# Patient Record
Sex: FEMALE | Race: BLACK OR AFRICAN AMERICAN | Employment: PART TIME | URBAN - METROPOLITAN AREA
[De-identification: names, ages, dates, MRNs, and addresses within clinical notes are randomized per-mention and may not be internally consistent; named-entity substitution may affect disease eponyms.]

---

## 2018-11-17 ENCOUNTER — HOSPITAL ENCOUNTER (EMERGENCY)
Age: 23
Discharge: HOME | End: 2018-11-17
Attending: EMERGENCY MEDICINE
Payer: COMMERCIAL

## 2018-11-17 VITALS
TEMPERATURE: 98.1 F | SYSTOLIC BLOOD PRESSURE: 121 MMHG | WEIGHT: 125 LBS | HEIGHT: 62 IN | OXYGEN SATURATION: 100 % | RESPIRATION RATE: 18 BRPM | HEART RATE: 93 BPM | DIASTOLIC BLOOD PRESSURE: 92 MMHG | BODY MASS INDEX: 23 KG/M2

## 2018-11-17 DIAGNOSIS — N30.00 ACUTE CYSTITIS WITHOUT HEMATURIA: ICD-10-CM

## 2018-11-17 DIAGNOSIS — R11.2 NAUSEA AND VOMITING, INTRACTABILITY OF VOMITING NOT SPECIFIED, UNSPECIFIED VOMITING TYPE: Primary | ICD-10-CM

## 2018-11-17 LAB
ALBUMIN SERPL-MCNC: 4.1 G/DL (ref 3.5–5)
ALBUMIN/GLOB SERPL: 1 {RATIO} (ref 1.1–2.2)
ALP SERPL-CCNC: 54 U/L (ref 45–117)
ALT SERPL-CCNC: 15 U/L (ref 12–78)
ANION GAP SERPL CALC-SCNC: 13 MMOL/L (ref 5–15)
APPEARANCE UR: ABNORMAL
AST SERPL-CCNC: 21 U/L (ref 15–37)
BACTERIA URNS QL MICRO: ABNORMAL /HPF
BASOPHILS # BLD: 0 K/UL (ref 0–0.1)
BASOPHILS NFR BLD: 0 % (ref 0–1)
BILIRUB SERPL-MCNC: 0.7 MG/DL (ref 0.2–1)
BILIRUB UR QL: NEGATIVE
BUN SERPL-MCNC: 11 MG/DL (ref 6–20)
BUN/CREAT SERPL: 19 (ref 12–20)
CALCIUM SERPL-MCNC: 9.3 MG/DL (ref 8.5–10.1)
CHLORIDE SERPL-SCNC: 102 MMOL/L (ref 97–108)
CO2 SERPL-SCNC: 21 MMOL/L (ref 21–32)
COLOR UR: ABNORMAL
CREAT SERPL-MCNC: 0.59 MG/DL (ref 0.55–1.02)
DIFFERENTIAL METHOD BLD: ABNORMAL
EOSINOPHIL # BLD: 0 K/UL (ref 0–0.4)
EOSINOPHIL NFR BLD: 0 % (ref 0–7)
EPITH CASTS URNS QL MICRO: ABNORMAL /LPF
ERYTHROCYTE [DISTWIDTH] IN BLOOD BY AUTOMATED COUNT: 14 % (ref 11.5–14.5)
GLOBULIN SER CALC-MCNC: 4.1 G/DL (ref 2–4)
GLUCOSE SERPL-MCNC: 90 MG/DL (ref 65–100)
GLUCOSE UR STRIP.AUTO-MCNC: NEGATIVE MG/DL
HCG SERPL-ACNC: ABNORMAL MIU/ML (ref 0–6)
HCG UR QL: POSITIVE
HCT VFR BLD AUTO: 44.1 % (ref 35–47)
HGB BLD-MCNC: 14.8 G/DL (ref 11.5–16)
HGB UR QL STRIP: NEGATIVE
IMM GRANULOCYTES # BLD: 0 K/UL (ref 0–0.04)
IMM GRANULOCYTES NFR BLD AUTO: 0 % (ref 0–0.5)
KETONES UR QL STRIP.AUTO: 40 MG/DL
LEUKOCYTE ESTERASE UR QL STRIP.AUTO: ABNORMAL
LYMPHOCYTES # BLD: 2.4 K/UL (ref 0.8–3.5)
LYMPHOCYTES NFR BLD: 30 % (ref 12–49)
MCH RBC QN AUTO: 28.4 PG (ref 26–34)
MCHC RBC AUTO-ENTMCNC: 33.6 G/DL (ref 30–36.5)
MCV RBC AUTO: 84.5 FL (ref 80–99)
MONOCYTES # BLD: 0.9 K/UL (ref 0–1)
MONOCYTES NFR BLD: 11 % (ref 5–13)
NEUTS SEG # BLD: 4.6 K/UL (ref 1.8–8)
NEUTS SEG NFR BLD: 58 % (ref 32–75)
NITRITE UR QL STRIP.AUTO: NEGATIVE
NRBC # BLD: 0 K/UL (ref 0–0.01)
NRBC BLD-RTO: 0 PER 100 WBC
PH UR STRIP: 7 [PH] (ref 5–8)
PLATELET # BLD AUTO: 270 K/UL (ref 150–400)
PMV BLD AUTO: 10.4 FL (ref 8.9–12.9)
POTASSIUM SERPL-SCNC: 4.1 MMOL/L (ref 3.5–5.1)
PROT SERPL-MCNC: 8.2 G/DL (ref 6.4–8.2)
PROT UR STRIP-MCNC: NEGATIVE MG/DL
RBC # BLD AUTO: 5.22 M/UL (ref 3.8–5.2)
RBC #/AREA URNS HPF: ABNORMAL /HPF (ref 0–5)
SODIUM SERPL-SCNC: 136 MMOL/L (ref 136–145)
SP GR UR REFRACTOMETRY: 1.02 (ref 1–1.03)
UR CULT HOLD, URHOLD: NORMAL
UROBILINOGEN UR QL STRIP.AUTO: 0.2 EU/DL (ref 0.2–1)
WBC # BLD AUTO: 7.8 K/UL (ref 3.6–11)
WBC URNS QL MICRO: ABNORMAL /HPF (ref 0–4)
YEAST URNS QL MICRO: PRESENT

## 2018-11-17 PROCEDURE — 81001 URINALYSIS AUTO W/SCOPE: CPT

## 2018-11-17 PROCEDURE — 96361 HYDRATE IV INFUSION ADD-ON: CPT

## 2018-11-17 PROCEDURE — 36415 COLL VENOUS BLD VENIPUNCTURE: CPT

## 2018-11-17 PROCEDURE — 80053 COMPREHEN METABOLIC PANEL: CPT

## 2018-11-17 PROCEDURE — 74011250637 HC RX REV CODE- 250/637: Performed by: EMERGENCY MEDICINE

## 2018-11-17 PROCEDURE — 84702 CHORIONIC GONADOTROPIN TEST: CPT

## 2018-11-17 PROCEDURE — 96374 THER/PROPH/DIAG INJ IV PUSH: CPT

## 2018-11-17 PROCEDURE — 85025 COMPLETE CBC W/AUTO DIFF WBC: CPT

## 2018-11-17 PROCEDURE — 74011250636 HC RX REV CODE- 250/636: Performed by: EMERGENCY MEDICINE

## 2018-11-17 PROCEDURE — 99284 EMERGENCY DEPT VISIT MOD MDM: CPT

## 2018-11-17 PROCEDURE — 86900 BLOOD TYPING SEROLOGIC ABO: CPT

## 2018-11-17 PROCEDURE — 81025 URINE PREGNANCY TEST: CPT

## 2018-11-17 PROCEDURE — 87086 URINE CULTURE/COLONY COUNT: CPT

## 2018-11-17 RX ORDER — ONDANSETRON 2 MG/ML
4 INJECTION INTRAMUSCULAR; INTRAVENOUS
Status: COMPLETED | OUTPATIENT
Start: 2018-11-17 | End: 2018-11-17

## 2018-11-17 RX ORDER — SODIUM CHLORIDE 0.9 % (FLUSH) 0.9 %
5-10 SYRINGE (ML) INJECTION EVERY 8 HOURS
Status: DISCONTINUED | OUTPATIENT
Start: 2018-11-17 | End: 2018-11-18 | Stop reason: HOSPADM

## 2018-11-17 RX ORDER — CEPHALEXIN 500 MG/1
500 CAPSULE ORAL 3 TIMES DAILY
Qty: 21 CAP | Refills: 0 | Status: SHIPPED | OUTPATIENT
Start: 2018-11-17 | End: 2018-11-24

## 2018-11-17 RX ORDER — SODIUM CHLORIDE 0.9 % (FLUSH) 0.9 %
5-10 SYRINGE (ML) INJECTION AS NEEDED
Status: DISCONTINUED | OUTPATIENT
Start: 2018-11-17 | End: 2018-11-18 | Stop reason: HOSPADM

## 2018-11-17 RX ORDER — PROMETHAZINE HYDROCHLORIDE 25 MG/1
25 SUPPOSITORY RECTAL
Qty: 12 SUPPOSITORY | Refills: 0 | Status: SHIPPED | OUTPATIENT
Start: 2018-11-17 | End: 2018-11-24

## 2018-11-17 RX ORDER — CEPHALEXIN 250 MG/1
500 CAPSULE ORAL
Status: COMPLETED | OUTPATIENT
Start: 2018-11-17 | End: 2018-11-17

## 2018-11-17 RX ORDER — PROMETHAZINE HYDROCHLORIDE 25 MG/1
25 TABLET ORAL
Qty: 12 TAB | Refills: 0 | Status: SHIPPED | OUTPATIENT
Start: 2018-11-17 | End: 2018-12-01

## 2018-11-17 RX ADMIN — SODIUM CHLORIDE 1000 ML: 900 INJECTION, SOLUTION INTRAVENOUS at 20:41

## 2018-11-17 RX ADMIN — CEPHALEXIN 500 MG: 250 CAPSULE ORAL at 21:41

## 2018-11-17 RX ADMIN — ONDANSETRON 4 MG: 2 INJECTION INTRAMUSCULAR; INTRAVENOUS at 20:41

## 2018-11-18 LAB
ABO + RH BLD: NORMAL
BLOOD BANK CMNT PATIENT-IMP: NORMAL

## 2018-11-18 NOTE — DISCHARGE INSTRUCTIONS
We hope that we have addressed all of your medical concerns. The examination and treatment you received in the Emergency Department were for an emergent problem and were not intended as complete care. It is important that you follow up with your healthcare provider(s) for ongoing care. If your symptoms worsen or do not improve as expected, and you are unable to reach your usual health care provider(s), you should return to the Emergency Department. Today's healthcare is undergoing tremendous change, and patient satisfaction surveys are one of the many tools to assess the quality of medical care. You may receive a survey from the [employer] regarding your experience in the Emergency Department. I hope that your experience has been completely positive, particularly the medical care that I provided. As such, please participate in the survey; anything less than excellent does not meet my expectations or intentions. [address] [place] [address] participate in nationally recognized quality of care measures. If your blood pressure is greater than 120/80, as reported below, we urge that you seek medical care to address the potential of high blood pressure, commonly known as hypertension. Hypertension can be hereditary or can be caused by certain medical conditions, pain, stress, or \"white coat syndrome. \"       Please make an appointment with your health care provider(s) for follow up of your Emergency Department visit. VITALS:   Patient Vitals for the past 8 hrs:   Temp Pulse Resp BP SpO2   11/17/18 2130 -- -- -- 114/71 100 %   11/17/18 2117 -- -- -- 114/76 100 %   11/17/18 2021 98.1 °F (36.7 °C) 93 18 114/83 100 %          Thank you for allowing us to provide you with medical care today. We realize that you have many choices for your emergency care needs. Please choose us in the future for any continued health care needs.       Regards, Jaylin Farooq [facility] [email or web address]   Office: [phone]            Recent Results (from the past 24 hour(s))   CBC WITH AUTOMATED DIFF    Collection Time: 11/17/18  8:39 PM   Result Value Ref Range    WBC 7.8 3.6 - 11.0 K/uL    RBC 5.22 (H) 3.80 - 5.20 M/uL    HGB 14.8 11.5 - 16.0 g/dL    HCT 44.1 35.0 - 47.0 %    MCV 84.5 80.0 - 99.0 FL    MCH 28.4 26.0 - 34.0 PG    MCHC 33.6 30.0 - 36.5 g/dL    RDW 14.0 11.5 - 14.5 %    PLATELET 663 227 - 691 K/uL    MPV 10.4 8.9 - 12.9 FL    NRBC 0.0 0  WBC    ABSOLUTE NRBC 0.00 0.00 - 0.01 K/uL    NEUTROPHILS 58 32 - 75 %    LYMPHOCYTES 30 12 - 49 %    MONOCYTES 11 5 - 13 %    EOSINOPHILS 0 0 - 7 %    BASOPHILS 0 0 - 1 %    IMMATURE GRANULOCYTES 0 0.0 - 0.5 %    ABS. NEUTROPHILS 4.6 1.8 - 8.0 K/UL    ABS. LYMPHOCYTES 2.4 0.8 - 3.5 K/UL    ABS. MONOCYTES 0.9 0.0 - 1.0 K/UL    ABS. EOSINOPHILS 0.0 0.0 - 0.4 K/UL    ABS. BASOPHILS 0.0 0.0 - 0.1 K/UL    ABS. IMM. GRANS. 0.0 0.00 - 0.04 K/UL    DF AUTOMATED     METABOLIC PANEL, COMPREHENSIVE    Collection Time: 11/17/18  8:39 PM   Result Value Ref Range    Sodium 136 136 - 145 mmol/L    Potassium 4.1 3.5 - 5.1 mmol/L    Chloride 102 97 - 108 mmol/L    CO2 21 21 - 32 mmol/L    Anion gap 13 5 - 15 mmol/L    Glucose 90 65 - 100 mg/dL    BUN 11 6 - 20 MG/DL    Creatinine 0.59 0.55 - 1.02 MG/DL    BUN/Creatinine ratio 19 12 - 20      GFR est AA >60 >60 ml/min/1.73m2    GFR est non-AA >60 >60 ml/min/1.73m2    Calcium 9.3 8.5 - 10.1 MG/DL    Bilirubin, total 0.7 0.2 - 1.0 MG/DL    ALT (SGPT) 15 12 - 78 U/L    AST (SGOT) 21 15 - 37 U/L    Alk.  phosphatase 54 45 - 117 U/L    Protein, total 8.2 6.4 - 8.2 g/dL    Albumin 4.1 3.5 - 5.0 g/dL    Globulin 4.1 (H) 2.0 - 4.0 g/dL    A-G Ratio 1.0 (L) 1.1 - 2.2     URINALYSIS W/MICROSCOPIC    Collection Time: 11/17/18  8:39 PM   Result Value Ref Range    Color YELLOW/STRAW      Appearance CLOUDY (A) CLEAR      Specific gravity 1.022 1.003 - 1.030 pH (UA) 7.0 5.0 - 8.0      Protein NEGATIVE  NEG mg/dL    Glucose NEGATIVE  NEG mg/dL    Ketone 40 (A) NEG mg/dL    Bilirubin NEGATIVE  NEG      Blood NEGATIVE  NEG      Urobilinogen 0.2 0.2 - 1.0 EU/dL    Nitrites NEGATIVE  NEG      Leukocyte Esterase SMALL (A) NEG      WBC 5-10 0 - 4 /hpf    RBC 0-5 0 - 5 /hpf    Epithelial cells MODERATE (A) FEW /lpf    Bacteria 1+ (A) NEG /hpf    Yeast PRESENT (A) NEG     URINE CULTURE HOLD SAMPLE    Collection Time: 11/17/18  8:39 PM   Result Value Ref Range    Urine culture hold        URINE ON HOLD IN MICROBIOLOGY DEPT FOR 3 DAYS. IF UNPRESERVED URINE IS SUBMITTED, IT CANNOT BE USED FOR ADDITIONAL TESTING AFTER 24 HRS, RECOLLECTION WILL BE REQUIRED. BETA HCG, QT    Collection Time: 11/17/18  8:39 PM   Result Value Ref Range    Beta HCG, QT 87,392 (H) 0 - 6 MIU/ML   HCG URINE, QL. - POC    Collection Time: 11/17/18  9:18 PM   Result Value Ref Range    Pregnancy test,urine (POC) POSITIVE (A) NEG         No results found. Nausea and Vomiting: Care Instructions  Your Care Instructions    When you are nauseated, you may feel weak and sweaty and notice a lot of saliva in your mouth. Nausea often leads to vomiting. Most of the time you do not need to worry about nausea and vomiting, but they can be signs of other illnesses. Two common causes of nausea and vomiting are stomach flu and food poisoning. Nausea and vomiting from viral stomach flu will usually start to improve within 24 hours. Nausea and vomiting from food poisoning may last from 12 to 48 hours. The doctor has checked you carefully, but problems can develop later. If you notice any problems or new symptoms, get medical treatment right away. Follow-up care is a key part of your treatment and safety. Be sure to make and go to all appointments, and call your doctor if you are having problems. It's also a good idea to know your test results and keep a list of the medicines you take.   How can you care for yourself at home? · To prevent dehydration, drink plenty of fluids, enough so that your urine is light yellow or clear like water. Choose water and other caffeine-free clear liquids until you feel better. If you have kidney, heart, or liver disease and have to limit fluids, talk with your doctor before you increase the amount of fluids you drink. · Rest in bed until you feel better. · When you are able to eat, try clear soups, mild foods, and liquids until all symptoms are gone for 12 to 48 hours. Other good choices include dry toast, crackers, cooked cereal, and gelatin dessert, such as Jell-O. When should you call for help? Call 911 anytime you think you may need emergency care. For example, call if:    · You passed out (lost consciousness).    Call your doctor now or seek immediate medical care if:    · You have symptoms of dehydration, such as:  ? Dry eyes and a dry mouth. ? Passing only a little dark urine. ? Feeling thirstier than usual.     · You have new or worsening belly pain.     · You have a new or higher fever.     · You vomit blood or what looks like coffee grounds.    Watch closely for changes in your health, and be sure to contact your doctor if:    · You have ongoing nausea and vomiting.     · Your vomiting is getting worse.     · Your vomiting lasts longer than 2 days.     · You are not getting better as expected. Where can you learn more? Go to http://geovanna-dev.info/. Enter 25 [id number] in the search box to learn more about \"Nausea and Vomiting: Care Instructions. \"  Current as of: November 20, 2017  Content Version: 11.8  © 9774-4146 [employer]. Care instructions adapted under license by [employer] (which disclaims liability or warranty for this information).  If you have questions about a medical condition or this instruction, always ask your healthcare professional. [address] any warranty or liability for your use of this information. Urinary Tract Infection in Pregnancy: Care Instructions  Your Care Instructions    A urinary tract infection, or UTI, is an infection of the bladder and other urinary structures. Most UTIs occur in the bladder. They often cause pain or burning when you urinate. UTI is the most common bacterial infection in pregnancy. If untreated, a UTI could lead to problems such as a kidney infection or  labor. Most UTIs can be cured with antibiotics. Your doctor will prescribe an antibiotic that is safe during pregnancy. Be sure to finish your medicine so that the infection does not spread to your kidneys. Follow-up care is a key part of your treatment and safety. Be sure to make and go to all appointments, and call your doctor if you are having problems. It's also a good idea to know your test results and keep a list of the medicines you take. How can you care for yourself at home? · Take your antibiotics as directed. Do not stop taking them just because you feel better. You need to take the full course of antibiotics. · Drink extra water and other fluids for the next day or two. This will help wash out the bacteria causing the infection. If you have kidney, heart, or liver disease and have to limit fluids, talk with your doctor before you increase the amount of fluids you drink. · Do not drink alcohol. · Urinate often. Try to empty your bladder each time. Preventing UTIs  · Drink plenty of fluids, enough so that your urine is light yellow or clear like water. This helps you urinate often, which clears bacteria from your system. If you have kidney, heart, or liver disease and have to limit fluids, talk with your doctor before you increase the amount of fluids you drink. · Urinate when you first have the urge. · Urinate right after you have sex. This is the best way for women to avoid UTIs.   · When going to the bathroom, wipe from front to back to keep bacteria from entering the vagina or urethra. When should you call for help? Call your doctor now or seek immediate medical care if:    · You have symptoms of a worse urinary tract infection. These may include:  ? Pain or burning when you urinate. ? A frequent need to urinate without being able to pass much urine. ? Pain in the flank, which is just below the rib cage and above the waist on either side of the back. ? Blood in your urine. ? A fever.    Watch closely for changes in your health, and be sure to contact your doctor if:    · You do not get better as expected. Where can you learn more? Go to http://geovanna-dev.info/. Enter M982 in the search box to learn more about \"Urinary Tract Infection in Pregnancy: Care Instructions. \"  Current as of: November 21, 2017  Content Version: 11.8  © 7172-4376 [employer]. Care instructions adapted under license by [employer] (which disclaims liability or warranty for this information). If you have questions about a medical condition or this instruction, always ask your healthcare professional. [place] [address] any warranty or liability for your use of this information.

## 2018-11-18 NOTE — ED PROVIDER NOTES
24 y.o. female ( A0, 7 weeks pregnant) with no significant past medical history presents with chief complaint of nausea and vomiting that started today. Pt reports associated dizziness. She notes that she went to see her OB/GYN who gave her Zofran. Pt notes that she has not taken her prenatals in the last 3 days. She denies any abd surgeries in the past. LMP was 10/01/18. Pt denies any vaginal discharge, vaginal bleeding, or dysuria currently. There are no other acute medical concerns at this time. Social hx: Patient denies Tobacco use. Denies EtOH use. Denies illicit drug abuse. PCP: Lillian Haas MD 
OB/GYN: Dr. Emma Glover. Melida Hill MD 
 
Note written by Luanne Braxton, as dictated by Brian Mars, DO 8:34 PM 
 
 
 
The history is provided by the patient. No  was used. No past medical history on file. No past surgical history on file. No family history on file. Social History Socioeconomic History  Marital status:  Spouse name: Not on file  Number of children: Not on file  Years of education: Not on file  Highest education level: Not on file Social Needs  Financial resource strain: Not on file  Food insecurity - worry: Not on file  Food insecurity - inability: Not on file  Transportation needs - medical: Not on file  Transportation needs - non-medical: Not on file Occupational History  Not on file Tobacco Use  Smoking status: Never Smoker Substance and Sexual Activity  Alcohol use: No  
 Drug use: No  
 Sexual activity: No  
  Birth control/protection: None Other Topics Concern  Not on file Social History Narrative  Not on file ALLERGIES: Patient has no known allergies. Review of Systems Constitutional: Negative for appetite change, chills, fever and unexpected weight change. HENT: Negative for ear pain, hearing loss, rhinorrhea and trouble swallowing. Eyes: Negative for pain and visual disturbance. Respiratory: Negative for cough, chest tightness and shortness of breath. Cardiovascular: Negative for chest pain and palpitations. Gastrointestinal: Positive for nausea and vomiting. Negative for abdominal distention, abdominal pain and blood in stool. Genitourinary: Negative for dysuria, hematuria, urgency, vaginal bleeding and vaginal discharge. Musculoskeletal: Negative for back pain and myalgias. Skin: Negative for rash. Neurological: Positive for dizziness. Negative for syncope, weakness and numbness. Psychiatric/Behavioral: Negative for confusion and suicidal ideas. All other systems reviewed and are negative. Vitals:  
 11/17/18 2021 11/17/18 2117 11/17/18 2130 11/17/18 2145 BP: 114/83 114/76 114/71 (!) 121/92 Pulse: 93 Resp: 18 Temp: 98.1 °F (36.7 °C) SpO2: 100% 100% 100% 100% Weight: 56.7 kg (125 lb) Height: 5' 2\" (1.575 m) Physical Exam  
Constitutional: She is oriented to person, place, and time. She appears well-developed and well-nourished. No distress. HENT:  
Head: Normocephalic and atraumatic. Right Ear: External ear normal.  
Left Ear: External ear normal.  
Nose: Nose normal.  
Mouth/Throat: Oropharynx is clear and moist. No oropharyngeal exudate. Eyes: Conjunctivae and EOM are normal. Pupils are equal, round, and reactive to light. Right eye exhibits no discharge. Left eye exhibits no discharge. No scleral icterus. Neck: Normal range of motion. Neck supple. No JVD present. No tracheal deviation present. Cardiovascular: Normal rate, regular rhythm, normal heart sounds and intact distal pulses. Exam reveals no gallop and no friction rub. No murmur heard. Pulmonary/Chest: Effort normal and breath sounds normal. No stridor. No respiratory distress. She has no decreased breath sounds. She has no wheezes. She has no rhonchi. She has no rales. She exhibits no tenderness. Abdominal: Soft. Bowel sounds are normal. She exhibits no distension. There is no tenderness. There is no rebound and no guarding. Musculoskeletal: Normal range of motion. She exhibits no edema or tenderness. Neurological: She is alert and oriented to person, place, and time. She has normal strength and normal reflexes. She displays normal reflexes. No cranial nerve deficit or sensory deficit. She exhibits normal muscle tone. Coordination normal. GCS eye subscore is 4. GCS verbal subscore is 5. GCS motor subscore is 6. Skin: Skin is warm and dry. No rash noted. She is not diaphoretic. No erythema. No pallor. Psychiatric: She has a normal mood and affect. Her behavior is normal. Judgment and thought content normal.  
Nursing note and vitals reviewed. Note written by Brice Gitelman, DO, Scribe, as dictated by No att. providers found 8:38 PM  
 
MDM Procedures Chief Complaint Patient presents with  Pregnancy Problem The patient's presenting problems have been discussed, and they are in agreement with the care plan formulated and outlined with them. I have encouraged them to ask questions as they arise throughout their visit. MEDICATIONS GIVEN: 
Medications  
sodium chloride (NS) flush 5-10 mL (not administered)  
sodium chloride (NS) flush 5-10 mL (not administered)  
sodium chloride 0.9 % bolus infusion 1,000 mL (0 mL IntraVENous IV Completed 11/17/18 2141) ondansetron [facility]) injection 4 mg (4 mg IntraVENous Given 11/17/18 2041) cephALEXin (KEFLEX) capsule 500 mg (500 mg Oral Given 11/17/18 2141) LABS REVIEWED: 
Recent Results (from the past 24 hour(s)) CBC WITH AUTOMATED DIFF Collection Time: 11/17/18  8:39 PM  
Result Value Ref Range WBC 7.8 3.6 - 11.0 K/uL  
 RBC 5.22 (H) 3.80 - 5.20 M/uL  
 HGB 14.8 11.5 - 16.0 g/dL HCT 44.1 35.0 - 47.0 % MCV 84.5 80.0 - 99.0 FL  
 MCH 28.4 26.0 - 34.0 PG  
 MCHC 33.6 30.0 - 36.5 g/dL  
 RDW 14.0 11.5 - 14.5 % PLATELET 073 159 - 516 K/uL MPV 10.4 8.9 - 12.9 FL  
 NRBC 0.0 0  WBC ABSOLUTE NRBC 0.00 0.00 - 0.01 K/uL NEUTROPHILS 58 32 - 75 % LYMPHOCYTES 30 12 - 49 % MONOCYTES 11 5 - 13 % EOSINOPHILS 0 0 - 7 % BASOPHILS 0 0 - 1 % IMMATURE GRANULOCYTES 0 0.0 - 0.5 % ABS. NEUTROPHILS 4.6 1.8 - 8.0 K/UL  
 ABS. LYMPHOCYTES 2.4 0.8 - 3.5 K/UL  
 ABS. MONOCYTES 0.9 0.0 - 1.0 K/UL  
 ABS. EOSINOPHILS 0.0 0.0 - 0.4 K/UL  
 ABS. BASOPHILS 0.0 0.0 - 0.1 K/UL  
 ABS. IMM. GRANS. 0.0 0.00 - 0.04 K/UL  
 DF AUTOMATED METABOLIC PANEL, COMPREHENSIVE Collection Time: 11/17/18  8:39 PM  
Result Value Ref Range Sodium 136 136 - 145 mmol/L Potassium 4.1 3.5 - 5.1 mmol/L Chloride 102 97 - 108 mmol/L  
 CO2 21 21 - 32 mmol/L Anion gap 13 5 - 15 mmol/L Glucose 90 65 - 100 mg/dL BUN 11 6 - 20 MG/DL Creatinine 0.59 0.55 - 1.02 MG/DL  
 BUN/Creatinine ratio 19 12 - 20 GFR est AA >60 >60 ml/min/1.73m2 GFR est non-AA >60 >60 ml/min/1.73m2 Calcium 9.3 8.5 - 10.1 MG/DL Bilirubin, total 0.7 0.2 - 1.0 MG/DL  
 ALT (SGPT) 15 12 - 78 U/L  
 AST (SGOT) 21 15 - 37 U/L Alk. phosphatase 54 45 - 117 U/L Protein, total 8.2 6.4 - 8.2 g/dL Albumin 4.1 3.5 - 5.0 g/dL Globulin 4.1 (H) 2.0 - 4.0 g/dL A-G Ratio 1.0 (L) 1.1 - 2.2 URINALYSIS W/MICROSCOPIC Collection Time: 11/17/18  8:39 PM  
Result Value Ref Range Color YELLOW/STRAW Appearance CLOUDY (A) CLEAR Specific gravity 1.022 1.003 - 1.030    
 pH (UA) 7.0 5.0 - 8.0 Protein NEGATIVE  NEG mg/dL Glucose NEGATIVE  NEG mg/dL Ketone 40 (A) NEG mg/dL Bilirubin NEGATIVE  NEG Blood NEGATIVE  NEG Urobilinogen 0.2 0.2 - 1.0 EU/dL Nitrites NEGATIVE  NEG Leukocyte Esterase SMALL (A) NEG    
 WBC 5-10 0 - 4 /hpf  
 RBC 0-5 0 - 5 /hpf Epithelial cells MODERATE (A) FEW /lpf Bacteria 1+ (A) NEG /hpf Yeast PRESENT (A) NEG URINE CULTURE HOLD SAMPLE  
 Collection Time: 11/17/18  8:39 PM  
Result Value Ref Range Urine culture hold URINE ON HOLD IN MICROBIOLOGY DEPT FOR 3 DAYS. IF UNPRESERVED URINE IS SUBMITTED, IT CANNOT BE USED FOR ADDITIONAL TESTING AFTER 24 HRS, RECOLLECTION WILL BE REQUIRED. BETA HCG, QT Collection Time: 11/17/18  8:39 PM  
Result Value Ref Range Beta HCG, QT 87,392 (H) 0 - 6 MIU/ML  
BLOOD TYPE, (ABO+RH) Collection Time: 11/17/18  8:39 PM  
Result Value Ref Range ABO/Rh(D) A POSITIVE Comment SAMPLE NOT USABLE FOR CROSSMATCH   
HCG URINE, QL. - POC Collection Time: 11/17/18  9:18 PM  
Result Value Ref Range Pregnancy test,urine (POC) POSITIVE (A) NEG    
 
 
VITAL SIGNS: 
Patient Vitals for the past 12 hrs: 
 Temp Pulse Resp BP SpO2  
11/17/18 2145    (!) 121/92 100 % 11/17/18 2130    114/71 100 % 11/17/18 2117    114/76 100 % 11/17/18 2021 98.1 °F (36.7 °C) 93 18 114/83 100 % RADIOLOGY RESULTS: 
The following have been ordered and reviewed: 
No results found. PROGRESS NOTES: 
Pt feeling better. Discussed results and plan with patient. Patient will be discharged home with OB/Gyn follow up. Patient instructed to return to the emergency room for any worsening symptoms or any other concerns. DIAGNOSIS: 
 
1. Nausea and vomiting, intractability of vomiting not specified, unspecified vomiting type 2. Acute cystitis without hematuria PLAN: 
Follow-up Information Follow up With Specialties Details Why Contact Venkatesh Nichols DO Obstetrics & Gynecology, Gynecology, Obstetrics Schedule an appointment as soon as possible for a visit  [address] [facility] [address] [facility] 
[phone] [facility] EMERGENCY DEPT Emergency Medicine  If symptoms worsen [address] 
[phone] Discharge Medication List as of 11/17/2018  9:39 PM  
  
START taking these medications Details cephALEXin (KEFLEX) 500 mg capsule Take 1 Cap by mouth three (3) times daily for 7 days. , Print, Disp-21 Cap, R-0  
  
promethazine (PHENERGAN) 25 mg tablet Take 1 Tab by mouth every six (6) hours as needed. , Print, Disp-12 Tab, R-0  
  
promethazine (PHENERGAN) 25 mg suppository Insert 1 Suppository into rectum every six (6) hours as needed for Nausea for up to 7 days. , Print, Disp-12 Suppository, R-0  
  
  
 
 
ED COURSE: The patient's hospital course has been uncomplicated.

## 2018-11-18 NOTE — ED NOTES
Discharged by provider. Leaves ambulatory with discharge paperwork, denies wheelchair. Denies questions.

## 2018-11-19 LAB
BACTERIA SPEC CULT: NORMAL
CC UR VC: NORMAL
SERVICE CMNT-IMP: NORMAL

## 2018-12-01 ENCOUNTER — HOSPITAL ENCOUNTER (EMERGENCY)
Age: 23
Discharge: HOME | End: 2018-12-01
Attending: EMERGENCY MEDICINE
Payer: COMMERCIAL

## 2018-12-01 VITALS
SYSTOLIC BLOOD PRESSURE: 115 MMHG | HEIGHT: 62 IN | HEART RATE: 92 BPM | WEIGHT: 125 LBS | RESPIRATION RATE: 20 BRPM | TEMPERATURE: 98.7 F | OXYGEN SATURATION: 100 % | BODY MASS INDEX: 23 KG/M2 | DIASTOLIC BLOOD PRESSURE: 60 MMHG

## 2018-12-01 DIAGNOSIS — B37.31 YEAST INFECTION OF THE VAGINA: ICD-10-CM

## 2018-12-01 DIAGNOSIS — O21.9 VOMITING DURING PREGNANCY: Primary | ICD-10-CM

## 2018-12-01 DIAGNOSIS — N39.0 URINARY TRACT INFECTION WITHOUT HEMATURIA, SITE UNSPECIFIED: ICD-10-CM

## 2018-12-01 LAB
ALBUMIN SERPL-MCNC: 3.8 G/DL (ref 3.5–5)
ALBUMIN/GLOB SERPL: 1 {RATIO} (ref 1.1–2.2)
ALP SERPL-CCNC: 43 U/L (ref 45–117)
ALT SERPL-CCNC: 19 U/L (ref 12–78)
ANION GAP SERPL CALC-SCNC: 16 MMOL/L (ref 5–15)
APPEARANCE UR: ABNORMAL
AST SERPL-CCNC: 30 U/L (ref 15–37)
BACTERIA URNS QL MICRO: ABNORMAL /HPF
BASOPHILS # BLD: 0 K/UL (ref 0–0.1)
BASOPHILS NFR BLD: 0 % (ref 0–1)
BILIRUB SERPL-MCNC: 1 MG/DL (ref 0.2–1)
BILIRUB UR QL: NEGATIVE
BUN SERPL-MCNC: 11 MG/DL (ref 6–20)
BUN/CREAT SERPL: 18 (ref 12–20)
CALCIUM SERPL-MCNC: 9.3 MG/DL (ref 8.5–10.1)
CHLORIDE SERPL-SCNC: 102 MMOL/L (ref 97–108)
CLUE CELLS VAG QL WET PREP: NORMAL
CO2 SERPL-SCNC: 18 MMOL/L (ref 21–32)
COLOR UR: ABNORMAL
COMMENT, HOLDF: NORMAL
CREAT SERPL-MCNC: 0.61 MG/DL (ref 0.55–1.02)
DIFFERENTIAL METHOD BLD: NORMAL
EOSINOPHIL # BLD: 0 K/UL (ref 0–0.4)
EOSINOPHIL NFR BLD: 0 % (ref 0–7)
EPITH CASTS URNS QL MICRO: ABNORMAL /LPF
ERYTHROCYTE [DISTWIDTH] IN BLOOD BY AUTOMATED COUNT: 13.3 % (ref 11.5–14.5)
GLOBULIN SER CALC-MCNC: 4 G/DL (ref 2–4)
GLUCOSE SERPL-MCNC: 86 MG/DL (ref 65–100)
GLUCOSE UR STRIP.AUTO-MCNC: NEGATIVE MG/DL
HCG SERPL-ACNC: ABNORMAL MIU/ML (ref 0–6)
HCT VFR BLD AUTO: 42.7 % (ref 35–47)
HGB BLD-MCNC: 14.6 G/DL (ref 11.5–16)
HGB UR QL STRIP: NEGATIVE
IMM GRANULOCYTES # BLD: 0 K/UL (ref 0–0.04)
IMM GRANULOCYTES NFR BLD AUTO: 0 % (ref 0–0.5)
KETONES UR QL STRIP.AUTO: 80 MG/DL
KOH PREP SPEC: NORMAL
LEUKOCYTE ESTERASE UR QL STRIP.AUTO: ABNORMAL
LIPASE SERPL-CCNC: 173 U/L (ref 73–393)
LYMPHOCYTES # BLD: 1.9 K/UL (ref 0.8–3.5)
LYMPHOCYTES NFR BLD: 25 % (ref 12–49)
MCH RBC QN AUTO: 28.4 PG (ref 26–34)
MCHC RBC AUTO-ENTMCNC: 34.2 G/DL (ref 30–36.5)
MCV RBC AUTO: 83.1 FL (ref 80–99)
MONOCYTES # BLD: 0.8 K/UL (ref 0–1)
MONOCYTES NFR BLD: 11 % (ref 5–13)
MUCOUS THREADS URNS QL MICRO: ABNORMAL /LPF
NEUTS SEG # BLD: 4.7 K/UL (ref 1.8–8)
NEUTS SEG NFR BLD: 63 % (ref 32–75)
NITRITE UR QL STRIP.AUTO: NEGATIVE
NRBC # BLD: 0 K/UL (ref 0–0.01)
NRBC BLD-RTO: 0 PER 100 WBC
PH UR STRIP: 7.5 [PH] (ref 5–8)
PLATELET # BLD AUTO: 251 K/UL (ref 150–400)
PMV BLD AUTO: 10.8 FL (ref 8.9–12.9)
POTASSIUM SERPL-SCNC: 4.2 MMOL/L (ref 3.5–5.1)
PROT SERPL-MCNC: 7.8 G/DL (ref 6.4–8.2)
PROT UR STRIP-MCNC: 30 MG/DL
RBC # BLD AUTO: 5.14 M/UL (ref 3.8–5.2)
RBC #/AREA URNS HPF: ABNORMAL /HPF (ref 0–5)
SAMPLES BEING HELD,HOLD: NORMAL
SERVICE CMNT-IMP: NORMAL
SODIUM SERPL-SCNC: 136 MMOL/L (ref 136–145)
SP GR UR REFRACTOMETRY: 1.03 (ref 1–1.03)
T VAGINALIS VAG QL WET PREP: NORMAL
UA: UC IF INDICATED,UAUC: ABNORMAL
UROBILINOGEN UR QL STRIP.AUTO: 0.2 EU/DL (ref 0.2–1)
WBC # BLD AUTO: 7.5 K/UL (ref 3.6–11)
WBC URNS QL MICRO: ABNORMAL /HPF (ref 0–4)
YEAST BUDDING URNS QL: PRESENT

## 2018-12-01 PROCEDURE — 87086 URINE CULTURE/COLONY COUNT: CPT

## 2018-12-01 PROCEDURE — 87210 SMEAR WET MOUNT SALINE/INK: CPT

## 2018-12-01 PROCEDURE — 96374 THER/PROPH/DIAG INJ IV PUSH: CPT

## 2018-12-01 PROCEDURE — 96361 HYDRATE IV INFUSION ADD-ON: CPT

## 2018-12-01 PROCEDURE — 36415 COLL VENOUS BLD VENIPUNCTURE: CPT

## 2018-12-01 PROCEDURE — 74011250636 HC RX REV CODE- 250/636: Performed by: EMERGENCY MEDICINE

## 2018-12-01 PROCEDURE — 96375 TX/PRO/DX INJ NEW DRUG ADDON: CPT

## 2018-12-01 PROCEDURE — 99284 EMERGENCY DEPT VISIT MOD MDM: CPT

## 2018-12-01 PROCEDURE — 74011250636 HC RX REV CODE- 250/636: Performed by: NURSE PRACTITIONER

## 2018-12-01 PROCEDURE — C9113 INJ PANTOPRAZOLE SODIUM, VIA: HCPCS | Performed by: NURSE PRACTITIONER

## 2018-12-01 PROCEDURE — 85025 COMPLETE CBC W/AUTO DIFF WBC: CPT

## 2018-12-01 PROCEDURE — 81001 URINALYSIS AUTO W/SCOPE: CPT

## 2018-12-01 PROCEDURE — 87591 N.GONORRHOEAE DNA AMP PROB: CPT

## 2018-12-01 PROCEDURE — 80053 COMPREHEN METABOLIC PANEL: CPT

## 2018-12-01 PROCEDURE — 83690 ASSAY OF LIPASE: CPT

## 2018-12-01 PROCEDURE — 84702 CHORIONIC GONADOTROPIN TEST: CPT

## 2018-12-01 RX ORDER — ONDANSETRON 2 MG/ML
4 INJECTION INTRAMUSCULAR; INTRAVENOUS
Status: COMPLETED | OUTPATIENT
Start: 2018-12-01 | End: 2018-12-01

## 2018-12-01 RX ORDER — CHLORPHENIRAMINE MALEATE 4 MG
TABLET ORAL 2 TIMES DAILY
Qty: 1 TUBE | Refills: 0 | Status: SHIPPED | OUTPATIENT
Start: 2018-12-01 | End: 2018-12-31

## 2018-12-01 RX ORDER — ONDANSETRON 4 MG/1
4 TABLET, ORALLY DISINTEGRATING ORAL
Qty: 6 TAB | Refills: 0 | Status: SHIPPED | OUTPATIENT
Start: 2018-12-01

## 2018-12-01 RX ORDER — NITROFURANTOIN 25; 75 MG/1; MG/1
100 CAPSULE ORAL 2 TIMES DAILY
Qty: 20 CAP | Refills: 0 | Status: SHIPPED | OUTPATIENT
Start: 2018-12-01 | End: 2018-12-11

## 2018-12-01 RX ORDER — PANTOPRAZOLE SODIUM 40 MG/10ML
40 INJECTION, POWDER, LYOPHILIZED, FOR SOLUTION INTRAVENOUS
Status: COMPLETED | OUTPATIENT
Start: 2018-12-01 | End: 2018-12-01

## 2018-12-01 RX ADMIN — SODIUM CHLORIDE 1000 ML: 900 INJECTION, SOLUTION INTRAVENOUS at 19:56

## 2018-12-01 RX ADMIN — ONDANSETRON 4 MG: 2 INJECTION INTRAMUSCULAR; INTRAVENOUS at 19:56

## 2018-12-01 RX ADMIN — PANTOPRAZOLE SODIUM 40 MG: 40 INJECTION, POWDER, FOR SOLUTION INTRAVENOUS at 21:08

## 2018-12-02 NOTE — ED NOTES
NP [facility] gave and reviewed discharge instructions with the patient. The patient verbalized understanding. The patient was given opportunity for questions. Patient discharged in stable condition to the waiting room via ambulatory with family.

## 2018-12-02 NOTE — DISCHARGE INSTRUCTIONS
Extreme Nausea and Vomiting in Pregnancy: Care Instructions  Your Care Instructions    Nausea and vomiting (often called morning sickness) are common in pregnancy. They are caused by pregnancy hormones and happen most often in the first 3 months. Some women get very sick and are not able to keep down food and fluids. This extreme morning sickness is called hyperemesis gravidarum. It can lead to a dangerous loss of fluids in the body. It also can keep you from gaining weight and getting proper nutrition during your pregnancy. Your body fluids are put back in balance with water and minerals called electrolytes. Medicine may help if you have severe nausea and vomiting. Follow-up care is a key part of your treatment and safety. Be sure to make and go to all appointments, and call your doctor if you are having problems. It's also a good idea to know your test results and keep a list of the medicines you take. How can you care for yourself at home? · Take your medicines exactly as prescribed. Call your doctor if you think you are having a problem with your medicine. · Drink plenty of fluids to prevent dehydration. Choose water and other caffeine-free clear liquids until you feel better. Try sipping on sports drinks that have salt and sugar in them. · Eat a small snack, such as crackers, before you get out of bed. Wait a few minutes, then get out of bed slowly. · Keep food in your stomach, but not too much at once. An empty stomach can make nausea worse. Eat several small meals every day instead of three large meals. · Eat more protein and less fat. · Get plenty of vitamin B6 by eating whole grains, nuts, seeds, and legumes. You can take vitamin B6 tablets if your doctor says it is okay. · Try to avoid smells and foods that make you feel sick to your stomach. · Get lots of rest.  · You may want to try acupressure bands.  They put pressure on an acupressure point in the wrist. Some women feel better using the bands.  · Thea may also help you feel better. You can use it in tea, take it as a pill, or use a thea syrup that you can buy at a health food store. When should you call for help? Call 911 anytime you think you may need emergency care. For example, call if:    · You passed out (lost consciousness).    Call your doctor now or seek immediate medical care if:    · You are sick to your stomach or cannot drink fluids.     · You have symptoms of dehydration, such as:  ? Dry eyes and a dry mouth. ? Passing only a little urine. ? Feeling thirstier than usual.     · You are not able to keep down your medicines.     · You have pain in your belly or pelvis.    Watch closely for changes in your health, and be sure to contact your doctor if:    · You do not get better as expected. Where can you learn more? Go to http://geovanna-dev.info/. Enter U134 in the search box to learn more about \"Extreme Nausea and Vomiting in Pregnancy: Care Instructions. \"  Current as of: November 21, 2017  Content Version: 11.8  © 4063-3659 [employer]. Care instructions adapted under license by [employer] (which disclaims liability or warranty for this information). If you have questions about a medical condition or this instruction, always ask your healthcare professional. [place] [address] any warranty or liability for your use of this information. Urinary Tract Infection in Women: Care Instructions  Your Care Instructions    A urinary tract infection, or UTI, is a general term for an infection anywhere between the kidneys and the urethra (where urine comes out). Most UTIs are bladder infections. They often cause pain or burning when you urinate. UTIs are caused by bacteria and can be cured with antibiotics. Be sure to complete your treatment so that the infection goes away. Follow-up care is a key part of your treatment and safety.  Be sure to make and go to all appointments, and call your doctor if you are having problems. It's also a good idea to know your test results and keep a list of the medicines you take. How can you care for yourself at home? · Take your antibiotics as directed. Do not stop taking them just because you feel better. You need to take the full course of antibiotics. · Drink extra water and other fluids for the next day or two. This may help wash out the bacteria that are causing the infection. (If you have kidney, heart, or liver disease and have to limit fluids, talk with your doctor before you increase your fluid intake.)  · Avoid drinks that are carbonated or have caffeine. They can irritate the bladder. · Urinate often. Try to empty your bladder each time. · To relieve pain, take a hot bath or lay a heating pad set on low over your lower belly or genital area. Never go to sleep with a heating pad in place. To prevent UTIs  · Drink plenty of water each day. This helps you urinate often, which clears bacteria from your system. (If you have kidney, heart, or liver disease and have to limit fluids, talk with your doctor before you increase your fluid intake.)  · Urinate when you need to. · Urinate right after you have sex. · Change sanitary pads often. · Avoid douches, bubble baths, feminine hygiene sprays, and other feminine hygiene products that have deodorants. · After going to the bathroom, wipe from front to back. When should you call for help? Call your doctor now or seek immediate medical care if:    · Symptoms such as fever, chills, nausea, or vomiting get worse or appear for the first time.     · You have new pain in your back just below your rib cage.  This is called flank pain.     · There is new blood or pus in your urine.     · You have any problems with your antibiotic medicine.    Watch closely for changes in your health, and be sure to contact your doctor if:    · You are not getting better after taking an antibiotic for 2 days.     · Your symptoms go away but then come back. Where can you learn more? Go to http://geovanna-dev.info/. Enter I419 in the search box to learn more about \"Urinary Tract Infection in Women: Care Instructions. \"  Current as of: March 21, 2018  Content Version: 11.8  © 0633-8643 [employer]. Care instructions adapted under license by [employer] (which disclaims liability or warranty for this information). If you have questions about a medical condition or this instruction, always ask your healthcare professional. [address] any warranty or liability for your use of this information. Candidiasis: Care Instructions  Your Care Instructions  Candidiasis (say \"kcn-bbt-AW-uh-girish\") is a yeast infection. Yeast normally lives in your body. But it can cause problems if your body's defenses don't work as they should. Some medicines can increase your chance of getting a yeast infection. These include antibiotics, steroids, and cancer drugs. And some diseases like AIDS and diabetes can make you more likely to get yeast infections. There are different types of yeast infections. Gwynneth Bench is a yeast infection in the mouth. It usually occurs in people with weak immune systems. It causes white patches inside the mouth and throat. Yeast infections of the skin usually occur in skin folds where the skin stays moist. They cause red, oozing patches on your skin. Babies can get these infections under the diaper. People who often wear gloves can get them on their hands. Many women get vaginal yeast infections. They are most common when women take antibiotics. These infections can cause the vagina to itch and burn. They also cause white discharge that looks like cottage cheese. In rare cases, yeast infects the blood. This can cause serious disease. This kind of infection is treated with medicine given through a needle into a vein (IV).   After you start treatment, a yeast infection usually goes away quickly. But if your immune system is weak, the infection may come back. Tell your doctor if you get yeast infections often. Follow-up care is a key part of your treatment and safety. Be sure to make and go to all appointments, and call your doctor if you are having problems. It's also a good idea to know your test results and keep a list of the medicines you take. How can you care for yourself at home? · Take your medicines exactly as prescribed. Call your doctor if you think you are having a problem with your medicine. · Use antibiotics only as directed by your doctor. · Eat yogurt with live cultures. It has bacteria called lactobacillus. It may help prevent some types of yeast infections. · Keep your skin clean and dry. Put powder on moist places. · If you are using a cream or suppository to treat a vaginal yeast infection, don't use condoms or a diaphragm. Use a different type of birth control. · Eat a healthy diet and get regular exercise. This will help keep your immune system strong. When should you call for help? Watch closely for changes in your health, and be sure to contact your doctor if:    · You do not get better as expected. Where can you learn more? Go to http://geovanna-dev.info/. Enter B536 in the search box to learn more about \"Candidiasis: Care Instructions. \"  Current as of: May 15, 2018  Content Version: 11.8  © 9861-3133 [employer]. Care instructions adapted under license by [employer]" (which disclaims liability or warranty for this information). If you have questions about a medical condition or this instruction, always ask your healthcare professional. [address] any warranty or liability for your use of this information.

## 2018-12-02 NOTE — ED PROVIDER NOTES
 
Pt w/ vomiting and pregnancy. Pt was here 10 days ago w/ similar symptoms. She has had constant vomiting during the pregnancy. Janet Robert is a  24 y.o. female without any relevant PMhx who presents ambulatory w/ her father to [facility] ED with cc of vomiting. LMP 10/1/2018. Pt reports that she is approximately 8w pregnant and \"vomiting every day\" since she became pregnant. She was seen in the ED for similar concerns 10d ago. She reports she is currently taking Vitamin b6 w/ Unisom for her vomiting \"but nothing works. \" She states she has seen her OBGYN, but not had an [address] [facility]. She reports that some days are better than others w/ regards to her vomiting- she reports yesterday was a good day. She has some lower abdominal cramping w/ vaginal discharge and dysuria for the last week. Denies any vaginal bleeding, fevers, chills, diarrhea, body aches, or hematuria. She reports last WWE/ pelvic exam was done approximately 6 mos ago, nothing since new onset of vaginal discharge. PCP: Alex Crow MD 
 
There are no other complaints, changes or physical findings at this time. Past Medical History:  
Diagnosis Date  Sinusitis  Vitamin D deficiency History reviewed. No pertinent surgical history. History reviewed. No pertinent family history. Social History Socioeconomic History  Marital status:  Spouse name: Not on file  Number of children: Not on file  Years of education: Not on file  Highest education level: Not on file Social Needs  Financial resource strain: Not on file  Food insecurity - worry: Not on file  Food insecurity - inability: Not on file  Transportation needs - medical: Not on file  Transportation needs - non-medical: Not on file Occupational History  Not on file Tobacco Use  Smoking status: Never Smoker  Smokeless tobacco: Never Used Substance and Sexual Activity  Alcohol use: No  
 Drug use: No  
 Sexual activity: Yes  
  Partners: Male Birth control/protection: None Other Topics Concern  Not on file Social History Narrative  Not on file ALLERGIES: Patient has no known allergies. Review of Systems Constitutional: Negative for activity change, appetite change, chills and fever. HENT: Negative for congestion, rhinorrhea, sinus pressure, sneezing and sore throat. Eyes: Negative for pain, discharge and visual disturbance. Respiratory: Negative for cough and shortness of breath. Cardiovascular: Negative for chest pain. Gastrointestinal: Positive for nausea and vomiting. Negative for abdominal pain and diarrhea. Genitourinary: Positive for dysuria, pelvic pain, urgency and vaginal discharge. Negative for flank pain, frequency, vaginal bleeding and vaginal pain. Musculoskeletal: Negative for arthralgias, back pain, gait problem, joint swelling, myalgias and neck pain. Skin: Negative for color change and rash. Neurological: Negative for dizziness, speech difficulty, weakness, light-headedness, numbness and headaches. Psychiatric/Behavioral: Negative for agitation, behavioral problems and confusion. All other systems reviewed and are negative. Vitals:  
 12/01/18 2005 12/01/18 2006 12/01/18 2048 12/01/18 2100 BP: 130/77  123/83 115/60 Pulse:      
Resp:      
Temp:      
SpO2:  100% 100% 100% Weight:      
Height:      
      
 
Physical Exam  
Constitutional: She is oriented to person, place, and time. She appears well-developed and well-nourished. No distress. HENT:  
Head: Normocephalic and atraumatic. Right Ear: External ear normal.  
Left Ear: External ear normal.  
Nose: Nose normal.  
Mouth/Throat: Oropharynx is clear and moist. No oropharyngeal exudate. Eyes: Conjunctivae and EOM are normal. Pupils are equal, round, and reactive to light. Neck: Normal range of motion. Neck supple. Cardiovascular: Normal rate, regular rhythm, normal heart sounds and intact distal pulses. Pulmonary/Chest: Effort normal and breath sounds normal.  
Abdominal: Soft. Bowel sounds are normal. There is no tenderness. There is no rebound and no guarding. Genitourinary: Pelvic exam was performed with patient supine. Cervix exhibits discharge (white thick ). Vaginal discharge (white, thick ) found. Musculoskeletal: Normal range of motion. Neurological: She is alert and oriented to person, place, and time. Skin: Skin is warm and dry. Psychiatric: Her speech is normal. Judgment and thought content normal. Her mood appears anxious. Tearful and anxious during exam   
Nursing note and vitals reviewed. MDM Number of Diagnoses or Management Options Urinary tract infection without hematuria, site unspecified:  
Vomiting during pregnancy:  
Yeast infection of the vagina:  
Diagnosis management comments: DDx: yeast infection, BV, UTI, dehydration, hyperemesis  25 yo F w/ ongoing N/V in early pregnancy. Does not appear dehydrated on labs- CBC/ CMP stable. BHCG rising appropriately w/o vaginal bleeding. (+ )yeast on pelvic cx. No concern for STI- declined prophy for GC. Leuk- bact w/ dysuria- started on Macrobid for UTI. Advised on s/sx management at home.  Pt doesn't like to take most antiemetics because they make her sleepy and dizzy. Discussed this as common SE. Reasons to return to ED provided/ reviewed. Encouraged OBGYN f/u ASAP. Amount and/or Complexity of Data Reviewed Clinical lab tests: ordered and reviewed Review and summarize past medical records: yes Discuss the patient with other providers: yes Vic Aburto) Procedures Procedure Note - Pelvic Exam:   
8:29 PM 
Performed by: Sybil Ewing NP Chaperoned by: Pooja Rm Pelvic exam was performed using  speculum. Further findings noted in physical exam.  
The procedure took 1-15 minutes, and pt tolerated poorly. LABORATORY TESTS: 
Recent Results (from the past 12 hour(s)) CBC WITH AUTOMATED DIFF Collection Time: 12/01/18  7:45 PM  
Result Value Ref Range WBC 7.5 3.6 - 11.0 K/uL  
 RBC 5.14 3.80 - 5.20 M/uL  
 HGB 14.6 11.5 - 16.0 g/dL HCT 42.7 35.0 - 47.0 % MCV 83.1 80.0 - 99.0 FL  
 MCH 28.4 26.0 - 34.0 PG  
 MCHC 34.2 30.0 - 36.5 g/dL  
 RDW 13.3 11.5 - 14.5 % PLATELET 984 893 - 418 K/uL MPV 10.8 8.9 - 12.9 FL  
 NRBC 0.0 0  WBC ABSOLUTE NRBC 0.00 0.00 - 0.01 K/uL NEUTROPHILS 63 32 - 75 % LYMPHOCYTES 25 12 - 49 % MONOCYTES 11 5 - 13 % EOSINOPHILS 0 0 - 7 % BASOPHILS 0 0 - 1 % IMMATURE GRANULOCYTES 0 0.0 - 0.5 % ABS. NEUTROPHILS 4.7 1.8 - 8.0 K/UL  
 ABS. LYMPHOCYTES 1.9 0.8 - 3.5 K/UL  
 ABS. MONOCYTES 0.8 0.0 - 1.0 K/UL  
 ABS. EOSINOPHILS 0.0 0.0 - 0.4 K/UL  
 ABS. BASOPHILS 0.0 0.0 - 0.1 K/UL  
 ABS. IMM. GRANS. 0.0 0.00 - 0.04 K/UL  
 DF AUTOMATED METABOLIC PANEL, COMPREHENSIVE Collection Time: 12/01/18  7:45 PM  
Result Value Ref Range Sodium 136 136 - 145 mmol/L Potassium 4.2 3.5 - 5.1 mmol/L Chloride 102 97 - 108 mmol/L  
 CO2 18 (L) 21 - 32 mmol/L Anion gap 16 (H) 5 - 15 mmol/L Glucose 86 65 - 100 mg/dL BUN 11 6 - 20 MG/DL Creatinine 0.61 0.55 - 1.02 MG/DL  
 BUN/Creatinine ratio 18 12 - 20 GFR est AA >60 >60 ml/min/1.73m2 GFR est non-AA >60 >60 ml/min/1.73m2 Calcium 9.3 8.5 - 10.1 MG/DL Bilirubin, total 1.0 0.2 - 1.0 MG/DL  
 ALT (SGPT) 19 12 - 78 U/L  
 AST (SGOT) 30 15 - 37 U/L Alk. phosphatase 43 (L) 45 - 117 U/L Protein, total 7.8 6.4 - 8.2 g/dL Albumin 3.8 3.5 - 5.0 g/dL Globulin 4.0 2.0 - 4.0 g/dL A-G Ratio 1.0 (L) 1.1 - 2.2 LIPASE Collection Time: 12/01/18  7:45 PM  
Result Value Ref Range Lipase 173 73 - 393 U/L  
SAMPLES BEING HELD Collection Time: 12/01/18  7:45 PM  
Result Value Ref Range SAMPLES BEING HELD 1SST,1RED,1BL   
 COMMENT Add-on orders for these samples will be processed based on acceptable specimen integrity and analyte stability, which may vary by analyte. BETA HCG, QT Collection Time: 12/01/18  7:45 PM  
Result Value Ref Range Beta HCG, ,497 (H) 0 - 6 MIU/ML  
ABRAHAM, OTHER SOURCES Collection Time: 12/01/18  8:28 PM  
Result Value Ref Range Special Requests: NO SPECIAL REQUESTS    
 ABRAHAM YEAST WITH PSEUDOHYPHAE    
WET PREP Collection Time: 12/01/18  8:28 PM  
Result Value Ref Range Clue cells CLUE CELLS ABSENT Wet prep NO TRICHOMONAS SEEN    
URINALYSIS W/ REFLEX CULTURE Collection Time: 12/01/18  8:29 PM  
Result Value Ref Range Color DARK YELLOW Appearance CLOUDY (A) CLEAR Specific gravity 1.030 1.003 - 1.030    
 pH (UA) 7.5 5.0 - 8.0 Protein 30 (A) NEG mg/dL Glucose NEGATIVE  NEG mg/dL Ketone 80 (A) NEG mg/dL Bilirubin NEGATIVE  NEG Blood NEGATIVE  NEG Urobilinogen 0.2 0.2 - 1.0 EU/dL Nitrites NEGATIVE  NEG Leukocyte Esterase SMALL (A) NEG    
 WBC 5-10 0 - 4 /hpf  
 RBC 0-5 0 - 5 /hpf Epithelial cells FEW FEW /lpf Bacteria 1+ (A) NEG /hpf  
 UA:UC IF INDICATED URINE CULTURE ORDERED (A) CNI Mucus TRACE (A) NEG /lpf Budding yeast PRESENT (A) NEG    
 
 
IMAGING RESULTS: 
No orders to display MEDICATIONS GIVEN: 
Medications sodium chloride 0.9 % bolus infusion 1,000 mL (0 mL IntraVENous IV Completed 12/1/18 2142) ondansetron [facility]) injection 4 mg (4 mg IntraVENous Given 12/1/18 1956) pantoprazole (PROTONIX) injection 40 mg (40 mg IntraVENous Given 12/1/18 2108) IMPRESSION: 
1. Vomiting during pregnancy 2. Yeast infection of the vagina 3. Urinary tract infection without hematuria, site unspecified PLAN: 
1. Discharge Medication List as of 12/1/2018  9:23 PM  
  
START taking these medications Details  
ondansetron (ZOFRAN ODT) 4 mg disintegrating tablet Take 1 Tab by mouth every eight (8) hours as needed for Nausea., Normal, Disp-6 Tab, R-0  
  
nitrofurantoin, macrocrystal-monohydrate, (MACROBID) 100 mg capsule Take 1 Cap by mouth two (2) times a day for 10 days. , Normal, Disp-20 Cap, R-0  
  
clotrimazole (LOTRIMIN) 1 % topical cream Apply  to affected area two (2) times a day for 30 days. Apply twice a day for 1 week, Normal, Disp-1 Tube, R-0  
  
  
 
2. Follow-up Information Follow up With Specialties Details Why Contact Info Your OBGYN   Schedule an appointment as soon as possible for a visit call Monday regarding your symptoms/ concerns [facility] EMERGENCY DEPT Emergency Medicine Go to As needed, If symptoms worsen [address] 
[phone] 3. Return to ED if worse Discharge Note: The patient is ready for discharge. The patient's signs, symptoms, diagnosis, and discharge instruction have been discussed and the patient has conveyed their understanding. The patient is to follow up as recommended or return to the ER should their symptoms worsen. Plan has been discussed and the patient is in agreement.  
 
Bran Dexter NP

## 2018-12-03 LAB
BACTERIA SPEC CULT: NORMAL
C TRACH DNA SPEC QL NAA+PROBE: NEGATIVE
CC UR VC: NORMAL
N GONORRHOEA DNA SPEC QL NAA+PROBE: NEGATIVE
SAMPLE TYPE: NORMAL
SERVICE CMNT-IMP: NORMAL
SERVICE CMNT-IMP: NORMAL
SPECIMEN SOURCE: NORMAL

## 2018-12-05 LAB
ANTIBODY SCREEN, EXTERNAL: NEGATIVE
CHLAMYDIA, EXTERNAL: NEGATIVE
HBSAG, EXTERNAL: NEGATIVE
HIV, EXTERNAL: NEGATIVE
N. GONORRHEA, EXTERNAL: NEGATIVE
RPR, EXTERNAL: NON REACTIVE
RUBELLA, EXTERNAL: NORMAL
TYPE, ABO & RH, EXTERNAL: NORMAL

## 2018-12-29 ENCOUNTER — HOSPITAL ENCOUNTER (EMERGENCY)
Age: 23
Discharge: HOME | DRG: 833 | End: 2018-12-30
Attending: EMERGENCY MEDICINE
Payer: COMMERCIAL

## 2018-12-29 DIAGNOSIS — O21.0 HYPEREMESIS GRAVIDARUM: Primary | ICD-10-CM

## 2018-12-29 DIAGNOSIS — N30.00 ACUTE CYSTITIS WITHOUT HEMATURIA: ICD-10-CM

## 2018-12-29 LAB
ALBUMIN SERPL-MCNC: 3.4 G/DL (ref 3.5–5)
ALBUMIN/GLOB SERPL: 0.8 {RATIO} (ref 1.1–2.2)
ALP SERPL-CCNC: 44 U/L (ref 45–117)
ALT SERPL-CCNC: 27 U/L (ref 12–78)
ANION GAP SERPL CALC-SCNC: 14 MMOL/L (ref 5–15)
AST SERPL-CCNC: 19 U/L (ref 15–37)
BASOPHILS # BLD: 0 K/UL (ref 0–0.1)
BASOPHILS NFR BLD: 0 % (ref 0–1)
BILIRUB SERPL-MCNC: 0.7 MG/DL (ref 0.2–1)
BUN SERPL-MCNC: 8 MG/DL (ref 6–20)
BUN/CREAT SERPL: 11 (ref 12–20)
CALCIUM SERPL-MCNC: 9.4 MG/DL (ref 8.5–10.1)
CHLORIDE SERPL-SCNC: 102 MMOL/L (ref 97–108)
CO2 SERPL-SCNC: 21 MMOL/L (ref 21–32)
CREAT SERPL-MCNC: 0.71 MG/DL (ref 0.55–1.02)
DIFFERENTIAL METHOD BLD: NORMAL
EOSINOPHIL # BLD: 0 K/UL (ref 0–0.4)
EOSINOPHIL NFR BLD: 0 % (ref 0–7)
ERYTHROCYTE [DISTWIDTH] IN BLOOD BY AUTOMATED COUNT: 14 % (ref 11.5–14.5)
GLOBULIN SER CALC-MCNC: 4.2 G/DL (ref 2–4)
GLUCOSE SERPL-MCNC: 82 MG/DL (ref 65–100)
HCT VFR BLD AUTO: 40.1 % (ref 35–47)
HGB BLD-MCNC: 13.8 G/DL (ref 11.5–16)
IMM GRANULOCYTES # BLD: 0 K/UL (ref 0–0.04)
IMM GRANULOCYTES NFR BLD AUTO: 0 % (ref 0–0.5)
LYMPHOCYTES # BLD: 1.9 K/UL (ref 0.8–3.5)
LYMPHOCYTES NFR BLD: 20 % (ref 12–49)
MCH RBC QN AUTO: 29.1 PG (ref 26–34)
MCHC RBC AUTO-ENTMCNC: 34.4 G/DL (ref 30–36.5)
MCV RBC AUTO: 84.4 FL (ref 80–99)
MONOCYTES # BLD: 0.8 K/UL (ref 0–1)
MONOCYTES NFR BLD: 8 % (ref 5–13)
NEUTS SEG # BLD: 6.7 K/UL (ref 1.8–8)
NEUTS SEG NFR BLD: 71 % (ref 32–75)
NRBC # BLD: 0 K/UL (ref 0–0.01)
NRBC BLD-RTO: 0 PER 100 WBC
PLATELET # BLD AUTO: 250 K/UL (ref 150–400)
PMV BLD AUTO: 10.7 FL (ref 8.9–12.9)
POTASSIUM SERPL-SCNC: 4.1 MMOL/L (ref 3.5–5.1)
PROT SERPL-MCNC: 7.6 G/DL (ref 6.4–8.2)
RBC # BLD AUTO: 4.75 M/UL (ref 3.8–5.2)
SODIUM SERPL-SCNC: 137 MMOL/L (ref 136–145)
UR CULT HOLD, URHOLD: NORMAL
WBC # BLD AUTO: 9.5 K/UL (ref 3.6–11)

## 2018-12-29 PROCEDURE — 85025 COMPLETE CBC W/AUTO DIFF WBC: CPT

## 2018-12-29 PROCEDURE — 87086 URINE CULTURE/COLONY COUNT: CPT

## 2018-12-29 PROCEDURE — 99284 EMERGENCY DEPT VISIT MOD MDM: CPT

## 2018-12-29 PROCEDURE — 96375 TX/PRO/DX INJ NEW DRUG ADDON: CPT

## 2018-12-29 PROCEDURE — 96361 HYDRATE IV INFUSION ADD-ON: CPT

## 2018-12-29 PROCEDURE — 74011250636 HC RX REV CODE- 250/636: Performed by: PHYSICIAN ASSISTANT

## 2018-12-29 PROCEDURE — 81001 URINALYSIS AUTO W/SCOPE: CPT

## 2018-12-29 PROCEDURE — 36415 COLL VENOUS BLD VENIPUNCTURE: CPT

## 2018-12-29 PROCEDURE — 96374 THER/PROPH/DIAG INJ IV PUSH: CPT

## 2018-12-29 PROCEDURE — 80053 COMPREHEN METABOLIC PANEL: CPT

## 2018-12-29 RX ORDER — ONDANSETRON 2 MG/ML
4 INJECTION INTRAMUSCULAR; INTRAVENOUS
Status: COMPLETED | OUTPATIENT
Start: 2018-12-29 | End: 2018-12-29

## 2018-12-29 RX ORDER — FAMOTIDINE 10 MG/ML
20 INJECTION INTRAVENOUS
Status: COMPLETED | OUTPATIENT
Start: 2018-12-29 | End: 2018-12-29

## 2018-12-29 RX ADMIN — ONDANSETRON 4 MG: 2 INJECTION INTRAMUSCULAR; INTRAVENOUS at 22:57

## 2018-12-29 RX ADMIN — SODIUM CHLORIDE 1000 ML: 900 INJECTION, SOLUTION INTRAVENOUS at 23:03

## 2018-12-29 RX ADMIN — FAMOTIDINE 20 MG: 10 INJECTION, SOLUTION INTRAVENOUS at 23:00

## 2018-12-30 VITALS
HEART RATE: 97 BPM | WEIGHT: 116 LBS | SYSTOLIC BLOOD PRESSURE: 121 MMHG | BODY MASS INDEX: 21.35 KG/M2 | HEIGHT: 62 IN | DIASTOLIC BLOOD PRESSURE: 89 MMHG | RESPIRATION RATE: 16 BRPM | OXYGEN SATURATION: 99 % | TEMPERATURE: 98.8 F

## 2018-12-30 LAB
APPEARANCE UR: CLEAR
BACTERIA URNS QL MICRO: ABNORMAL /HPF
BILIRUB UR QL: NEGATIVE
COLOR UR: ABNORMAL
EPITH CASTS URNS QL MICRO: ABNORMAL /LPF
GLUCOSE UR STRIP.AUTO-MCNC: NEGATIVE MG/DL
HGB UR QL STRIP: NEGATIVE
HYALINE CASTS URNS QL MICRO: ABNORMAL /LPF (ref 0–5)
KETONES UR QL STRIP.AUTO: >80 MG/DL
LEUKOCYTE ESTERASE UR QL STRIP.AUTO: ABNORMAL
NITRITE UR QL STRIP.AUTO: NEGATIVE
PH UR STRIP: 6 [PH] (ref 5–8)
PROT UR STRIP-MCNC: ABNORMAL MG/DL
RBC #/AREA URNS HPF: ABNORMAL /HPF (ref 0–5)
SP GR UR REFRACTOMETRY: 1.03 (ref 1–1.03)
UROBILINOGEN UR QL STRIP.AUTO: 0.2 EU/DL (ref 0.2–1)
WBC URNS QL MICRO: ABNORMAL /HPF (ref 0–4)

## 2018-12-30 PROCEDURE — 96375 TX/PRO/DX INJ NEW DRUG ADDON: CPT

## 2018-12-30 PROCEDURE — 74011000250 HC RX REV CODE- 250: Performed by: PHYSICIAN ASSISTANT

## 2018-12-30 PROCEDURE — 74011250636 HC RX REV CODE- 250/636: Performed by: PHYSICIAN ASSISTANT

## 2018-12-30 RX ORDER — CEFTRIAXONE 1 G/1
INJECTION, POWDER, FOR SOLUTION INTRAMUSCULAR; INTRAVENOUS
Status: DISCONTINUED
Start: 2018-12-30 | End: 2018-12-30 | Stop reason: HOSPADM

## 2018-12-30 RX ORDER — WATER FOR INJECTION,STERILE
VIAL (ML) INJECTION
Status: DISCONTINUED
Start: 2018-12-30 | End: 2018-12-30 | Stop reason: HOSPADM

## 2018-12-30 RX ORDER — PROMETHAZINE HYDROCHLORIDE 25 MG/1
25 SUPPOSITORY RECTAL
Qty: 12 SUPPOSITORY | Refills: 0 | Status: ON HOLD | OUTPATIENT
Start: 2018-12-30 | End: 2019-01-02

## 2018-12-30 RX ADMIN — CEFTRIAXONE SODIUM 1 G: 1 INJECTION, POWDER, FOR SOLUTION INTRAMUSCULAR; INTRAVENOUS at 01:34

## 2018-12-30 NOTE — ED NOTES
Discharge given by provider. Patient denies any questions. Patient is ambulatory and is leaving with family.

## 2018-12-30 NOTE — ED TRIAGE NOTES
Triage: 13 weeks pregnant complaints of nausea is taking Dimenhydrinate and Zofran without relief. Not able to hold fluids or food down.

## 2018-12-30 NOTE — ED PROVIDER NOTES
Gustavo Wiley is a 24 y.o. female  who presents by private vehicle to ER with c/o Patient presents with: 
Nausea Patient is 13 weeks pregnant and complains of nausea and vomiting for 5-6 weeks. Patient has been taking diclegis and zofran with minimal relief. Patient denies fever or chills. Patient reports epigastric abdominal pain. Patient denies vaginal bleeding or discharge. Patient denies abdominal cramping. Patient denies any other significant medical history. She specifically denies any fevers, chills,  chest pain, shortness of breath, headache, rash, diarrhea, abdominal pain, urinary/bowel changes, sweating or weight loss. PCP: Tita Nelson MD  
PMHx significant for: Past Medical History: 
No date: Sinusitis No date: Vitamin D deficiency PSHx significant for: History reviewed. No pertinent surgical history. Social Hx: Tobacco use: Social History Tobacco Use Smoking status: Never Smoker Smokeless tobacco: Never Used 
; EtOH use: The patient states she drinks 0 per week.; Illicit Drug use: Allergies: 
No Known Allergies There are no other complaints, changes or physical findings at this time. Past Medical History:  
Diagnosis Date  Sinusitis  Vitamin D deficiency History reviewed. No pertinent surgical history. History reviewed. No pertinent family history. Social History Socioeconomic History  Marital status:  Spouse name: Not on file  Number of children: Not on file  Years of education: Not on file  Highest education level: Not on file Social Needs  Financial resource strain: Not on file  Food insecurity - worry: Not on file  Food insecurity - inability: Not on file  Transportation needs - medical: Not on file  Transportation needs - non-medical: Not on file Occupational History  Not on file Tobacco Use  Smoking status: Never Smoker  Smokeless tobacco: Never Used Substance and Sexual Activity  Alcohol use: No  
 Drug use: No  
 Sexual activity: Yes  
  Partners: Male Birth control/protection: None Other Topics Concern  Not on file Social History Narrative  Not on file ALLERGIES: Patient has no known allergies. Review of Systems Constitutional: Negative for activity change, chills and fever. HENT: Negative for congestion, rhinorrhea and sore throat. Respiratory: Negative for shortness of breath. Cardiovascular: Negative for chest pain and leg swelling. Gastrointestinal: Positive for nausea and vomiting. Negative for abdominal pain and diarrhea. Genitourinary: Negative for dysuria, vaginal bleeding and vaginal discharge. Musculoskeletal: Negative for arthralgias and myalgias. Neurological: Negative for dizziness. Psychiatric/Behavioral: Negative for confusion. All other systems reviewed and are negative. Vitals:  
 12/29/18 2345 12/30/18 0000 12/30/18 0015 12/30/18 0030 BP: 121/86 119/83 124/85 122/85 Pulse:      
Resp:      
Temp:      
SpO2: 100%  99% 99% Weight:      
Height:      
      
 
Physical Exam  
Constitutional: She is oriented to person, place, and time. She appears well-developed. She appears ill. HENT:  
Head: Normocephalic and atraumatic. Right Ear: External ear normal.  
Left Ear: External ear normal.  
Nose: Nose normal.  
Mouth/Throat: Oropharynx is clear and moist. No oropharyngeal exudate. Eyes: Conjunctivae, EOM and lids are normal. Right eye exhibits no discharge. Left eye exhibits no discharge. Neck: Normal range of motion. No tracheal deviation present. No thyromegaly present. Cardiovascular: Normal rate, regular rhythm, normal heart sounds and intact distal pulses. Pulmonary/Chest: Effort normal and breath sounds normal.  
Abdominal: Soft. Normal appearance and bowel sounds are normal. There is tenderness in the epigastric area.   
Non-surgical abdominal exam  
 Musculoskeletal: Normal range of motion. Neurological: She is alert and oriented to person, place, and time. Skin: Skin is warm and dry. Psychiatric: She has a normal mood and affect. Judgment normal.  
  
 
MDM Number of Diagnoses or Management Options Acute cystitis without hematuria:  
Hyperemesis gravidarum:  
Diagnosis management comments: Assesment/Plan- 24 y.o. Patient presents with: 
Nausea 
differential includes: uti, hyperemesis , electrolyte abnormality, dehydration. Labs reviewed with urine analysis consistent with UTI and mild dehydration. Patient feeling better in ED after fluids and medications. Recommend OB follow up. Patient educated on reasons to return to the ED. Procedures

## 2018-12-30 NOTE — DISCHARGE INSTRUCTIONS
Extreme Nausea and Vomiting in Pregnancy: Care Instructions  Your Care Instructions    Nausea and vomiting (often called morning sickness) are common in pregnancy. They are caused by pregnancy hormones and happen most often in the first 3 months. Some women get very sick and are not able to keep down food and fluids. This extreme morning sickness is called hyperemesis gravidarum. It can lead to a dangerous loss of fluids in the body. It also can keep you from gaining weight and getting proper nutrition during your pregnancy. Your body fluids are put back in balance with water and minerals called electrolytes. Medicine may help if you have severe nausea and vomiting. Follow-up care is a key part of your treatment and safety. Be sure to make and go to all appointments, and call your doctor if you are having problems. It's also a good idea to know your test results and keep a list of the medicines you take. How can you care for yourself at home? · Take your medicines exactly as prescribed. Call your doctor if you think you are having a problem with your medicine. · Drink plenty of fluids to prevent dehydration. Choose water and other caffeine-free clear liquids until you feel better. Try sipping on sports drinks that have salt and sugar in them. · Eat a small snack, such as crackers, before you get out of bed. Wait a few minutes, then get out of bed slowly. · Keep food in your stomach, but not too much at once. An empty stomach can make nausea worse. Eat several small meals every day instead of three large meals. · Eat more protein and less fat. · Get plenty of vitamin B6 by eating whole grains, nuts, seeds, and legumes. You can take vitamin B6 tablets if your doctor says it is okay. · Try to avoid smells and foods that make you feel sick to your stomach. · Get lots of rest.  · You may want to try acupressure bands.  They put pressure on an acupressure point in the wrist. Some women feel better using the bands.  · Thea may also help you feel better. You can use it in tea, take it as a pill, or use a thea syrup that you can buy at a health food store. When should you call for help? Call 911 anytime you think you may need emergency care. For example, call if:    · You passed out (lost consciousness).    Call your doctor now or seek immediate medical care if:    · You are sick to your stomach or cannot drink fluids.     · You have symptoms of dehydration, such as:  ? Dry eyes and a dry mouth. ? Passing only a little urine. ? Feeling thirstier than usual.     · You are not able to keep down your medicines.     · You have pain in your belly or pelvis.    Watch closely for changes in your health, and be sure to contact your doctor if:    · You do not get better as expected. Where can you learn more? Go to http://geovanna-dev.info/. Enter P140 in the search box to learn more about \"Extreme Nausea and Vomiting in Pregnancy: Care Instructions. \"  Current as of: 2017  Content Version: 11.8  © 5476-8660 [employer]. Care instructions adapted under license by [employer] (which disclaims liability or warranty for this information). If you have questions about a medical condition or this instruction, always ask your healthcare professional. [place] [address] any warranty or liability for your use of this information. Urinary Tract Infection in Pregnancy: Care Instructions  Your Care Instructions    A urinary tract infection, or UTI, is an infection of the bladder and other urinary structures. Most UTIs occur in the bladder. They often cause pain or burning when you urinate. UTI is the most common bacterial infection in pregnancy. If untreated, a UTI could lead to problems such as a kidney infection or  labor. Most UTIs can be cured with antibiotics. Your doctor will prescribe an antibiotic that is safe during pregnancy.  Be sure to finish your medicine so that the infection does not spread to your kidneys. Follow-up care is a key part of your treatment and safety. Be sure to make and go to all appointments, and call your doctor if you are having problems. It's also a good idea to know your test results and keep a list of the medicines you take. How can you care for yourself at home? · Take your antibiotics as directed. Do not stop taking them just because you feel better. You need to take the full course of antibiotics. · Drink extra water and other fluids for the next day or two. This will help wash out the bacteria causing the infection. If you have kidney, heart, or liver disease and have to limit fluids, talk with your doctor before you increase the amount of fluids you drink. · Do not drink alcohol. · Urinate often. Try to empty your bladder each time. Preventing UTIs  · Drink plenty of fluids, enough so that your urine is light yellow or clear like water. This helps you urinate often, which clears bacteria from your system. If you have kidney, heart, or liver disease and have to limit fluids, talk with your doctor before you increase the amount of fluids you drink. · Urinate when you first have the urge. · Urinate right after you have sex. This is the best way for women to avoid UTIs. · When going to the bathroom, wipe from front to back to keep bacteria from entering the vagina or urethra. When should you call for help? Call your doctor now or seek immediate medical care if:    · You have symptoms of a worse urinary tract infection. These may include:  ? Pain or burning when you urinate. ? A frequent need to urinate without being able to pass much urine. ? Pain in the flank, which is just below the rib cage and above the waist on either side of the back. ? Blood in your urine. ? A fever.    Watch closely for changes in your health, and be sure to contact your doctor if:    · You do not get better as expected.    Where can you learn more? Go to http://geovanna-dev.info/. Enter M982 in the search box to learn more about \"Urinary Tract Infection in Pregnancy: Care Instructions. \"  Current as of: November 21, 2017  Content Version: 11.8  © 8949-2388 [employer]. Care instructions adapted under license by [employer] (which disclaims liability or warranty for this information). If you have questions about a medical condition or this instruction, always ask your healthcare professional. [address] any warranty or liability for your use of this information. We hope that we have addressed all of your medical concerns. The examination and treatment you received in the Emergency Department were for an emergent problem and were not intended as complete care. It is important that you follow up with your healthcare provider(s) for ongoing care. If your symptoms worsen or do not improve as expected, and you are unable to reach your usual health care provider(s), you should return to the Emergency Department. Today's healthcare is undergoing tremendous change, and patient satisfaction surveys are one of the many tools to assess the quality of medical care. You may receive a survey from the [employer] regarding your experience in the Emergency Department. I hope that your experience has been completely positive, particularly the medical care that I provided. As such, please participate in the survey; anything less than excellent does not meet my expectations or intentions. [address] [facility] and 8 [facility] participate in nationally recognized quality of care measures. If your blood pressure is greater than 120/80, as reported below, we urge that you seek medical care to address the potential of high blood pressure, commonly known as hypertension.    Hypertension can be hereditary or can be caused by certain medical conditions, pain, stress, or \"white coat syndrome. \"       Please make an appointment with your health care provider(s) for follow up of your Emergency Department visit. VITALS:   Patient Vitals for the past 8 hrs:   Temp Pulse Resp BP SpO2   12/30/18 0030 -- -- -- 122/85 99 %   12/30/18 0015 -- -- -- 124/85 99 %   12/30/18 0000 -- -- -- 119/83 --   12/29/18 2345 -- -- -- 121/86 100 %   12/29/18 2330 -- -- -- (!) 127/92 100 %   12/29/18 2315 -- -- -- 116/90 100 %   12/29/18 2300 -- -- -- 94/67 99 %   12/29/18 2219 98.8 °F (37.1 °C) 97 16 134/84 100 %          Thank you for allowing us to provide you with medical care today. We realize that you have many choices for your emergency care needs. Please choose us in the future for any continued health care needs. Melia Vu, [facility].   Office: [phone]            Recent Results (from the past 24 hour(s))   CBC WITH AUTOMATED DIFF    Collection Time: 12/29/18 11:04 PM   Result Value Ref Range    WBC 9.5 3.6 - 11.0 K/uL    RBC 4.75 3.80 - 5.20 M/uL    HGB 13.8 11.5 - 16.0 g/dL    HCT 40.1 35.0 - 47.0 %    MCV 84.4 80.0 - 99.0 FL    MCH 29.1 26.0 - 34.0 PG    MCHC 34.4 30.0 - 36.5 g/dL    RDW 14.0 11.5 - 14.5 %    PLATELET 087 702 - 515 K/uL    MPV 10.7 8.9 - 12.9 FL    NRBC 0.0 0  WBC    ABSOLUTE NRBC 0.00 0.00 - 0.01 K/uL    NEUTROPHILS 71 32 - 75 %    LYMPHOCYTES 20 12 - 49 %    MONOCYTES 8 5 - 13 %    EOSINOPHILS 0 0 - 7 %    BASOPHILS 0 0 - 1 %    IMMATURE GRANULOCYTES 0 0.0 - 0.5 %    ABS. NEUTROPHILS 6.7 1.8 - 8.0 K/UL    ABS. LYMPHOCYTES 1.9 0.8 - 3.5 K/UL    ABS. MONOCYTES 0.8 0.0 - 1.0 K/UL    ABS. EOSINOPHILS 0.0 0.0 - 0.4 K/UL    ABS. BASOPHILS 0.0 0.0 - 0.1 K/UL    ABS. IMM.  GRANS. 0.0 0.00 - 0.04 K/UL    DF AUTOMATED     METABOLIC PANEL, COMPREHENSIVE    Collection Time: 12/29/18 11:04 PM   Result Value Ref Range    Sodium 137 136 - 145 mmol/L    Potassium 4.1 3.5 - 5.1 mmol/L Chloride 102 97 - 108 mmol/L    CO2 21 21 - 32 mmol/L    Anion gap 14 5 - 15 mmol/L    Glucose 82 65 - 100 mg/dL    BUN 8 6 - 20 MG/DL    Creatinine 0.71 0.55 - 1.02 MG/DL    BUN/Creatinine ratio 11 (L) 12 - 20      GFR est AA >60 >60 ml/min/1.73m2    GFR est non-AA >60 >60 ml/min/1.73m2    Calcium 9.4 8.5 - 10.1 MG/DL    Bilirubin, total 0.7 0.2 - 1.0 MG/DL    ALT (SGPT) 27 12 - 78 U/L    AST (SGOT) 19 15 - 37 U/L    Alk. phosphatase 44 (L) 45 - 117 U/L    Protein, total 7.6 6.4 - 8.2 g/dL    Albumin 3.4 (L) 3.5 - 5.0 g/dL    Globulin 4.2 (H) 2.0 - 4.0 g/dL    A-G Ratio 0.8 (L) 1.1 - 2.2     URINALYSIS W/MICROSCOPIC    Collection Time: 12/29/18 11:04 PM   Result Value Ref Range    Color YELLOW/STRAW      Appearance CLEAR CLEAR      Specific gravity 1.026 1.003 - 1.030      pH (UA) 6.0 5.0 - 8.0      Protein TRACE (A) NEG mg/dL    Glucose NEGATIVE  NEG mg/dL    Ketone >80 (A) NEG mg/dL    Bilirubin NEGATIVE  NEG      Blood NEGATIVE  NEG      Urobilinogen 0.2 0.2 - 1.0 EU/dL    Nitrites NEGATIVE  NEG      Leukocyte Esterase SMALL (A) NEG      WBC 10-20 0 - 4 /hpf    RBC 0-5 0 - 5 /hpf    Epithelial cells MODERATE (A) FEW /lpf    Bacteria 2+ (A) NEG /hpf    Hyaline cast 2-5 0 - 5 /lpf   URINE CULTURE HOLD SAMPLE    Collection Time: 12/29/18 11:04 PM   Result Value Ref Range    Urine culture hold        URINE ON HOLD IN MICROBIOLOGY DEPT FOR 3 DAYS. IF UNPRESERVED URINE IS SUBMITTED, IT CANNOT BE USED FOR ADDITIONAL TESTING AFTER 24 HRS, RECOLLECTION WILL BE REQUIRED. No results found.

## 2018-12-31 LAB
BACTERIA SPEC CULT: NORMAL
CC UR VC: NORMAL
SERVICE CMNT-IMP: NORMAL

## 2019-01-02 ENCOUNTER — HOSPITAL ENCOUNTER (INPATIENT)
Age: 24
LOS: 3 days | Discharge: HOME | DRG: 833 | End: 2019-01-05
Attending: EMERGENCY MEDICINE | Admitting: OBSTETRICS & GYNECOLOGY
Payer: COMMERCIAL

## 2019-01-02 DIAGNOSIS — O21.0 HYPEREMESIS GRAVIDARUM: ICD-10-CM

## 2019-01-02 DIAGNOSIS — O21.9: Primary | ICD-10-CM

## 2019-01-02 DIAGNOSIS — R07.9 CHEST PAIN, UNSPECIFIED TYPE: ICD-10-CM

## 2019-01-02 LAB
ALBUMIN SERPL-MCNC: 3.3 G/DL (ref 3.5–5)
ALBUMIN/GLOB SERPL: 0.9 {RATIO} (ref 1.1–2.2)
ALP SERPL-CCNC: 39 U/L (ref 45–117)
ALT SERPL-CCNC: 41 U/L (ref 12–78)
ANION GAP SERPL CALC-SCNC: 13 MMOL/L (ref 5–15)
APPEARANCE UR: ABNORMAL
AST SERPL-CCNC: 32 U/L (ref 15–37)
ATRIAL RATE: 96 BPM
BACTERIA URNS QL MICRO: ABNORMAL /HPF
BASOPHILS # BLD: 0 K/UL (ref 0–0.1)
BASOPHILS NFR BLD: 0 % (ref 0–1)
BILIRUB SERPL-MCNC: 0.7 MG/DL (ref 0.2–1)
BILIRUB UR QL: NEGATIVE
BUN SERPL-MCNC: 10 MG/DL (ref 6–20)
BUN/CREAT SERPL: 16 (ref 12–20)
CALCIUM SERPL-MCNC: 9 MG/DL (ref 8.5–10.1)
CALCULATED P AXIS, ECG09: 83 DEGREES
CALCULATED R AXIS, ECG10: 79 DEGREES
CALCULATED T AXIS, ECG11: 20 DEGREES
CHLORIDE SERPL-SCNC: 104 MMOL/L (ref 97–108)
CO2 SERPL-SCNC: 21 MMOL/L (ref 21–32)
COLOR UR: ABNORMAL
CREAT SERPL-MCNC: 0.62 MG/DL (ref 0.55–1.02)
DIAGNOSIS, 93000: NORMAL
DIFFERENTIAL METHOD BLD: NORMAL
EOSINOPHIL # BLD: 0 K/UL (ref 0–0.4)
EOSINOPHIL NFR BLD: 0 % (ref 0–7)
EPITH CASTS URNS QL MICRO: ABNORMAL /LPF
ERYTHROCYTE [DISTWIDTH] IN BLOOD BY AUTOMATED COUNT: 13.8 % (ref 11.5–14.5)
GLOBULIN SER CALC-MCNC: 3.7 G/DL (ref 2–4)
GLUCOSE SERPL-MCNC: 77 MG/DL (ref 65–100)
GLUCOSE UR STRIP.AUTO-MCNC: NEGATIVE MG/DL
HCT VFR BLD AUTO: 38.2 % (ref 35–47)
HGB BLD-MCNC: 13 G/DL (ref 11.5–16)
HGB UR QL STRIP: NEGATIVE
IMM GRANULOCYTES # BLD: 0 K/UL (ref 0–0.04)
IMM GRANULOCYTES NFR BLD AUTO: 0 % (ref 0–0.5)
KETONES UR QL STRIP.AUTO: >80 MG/DL
LEUKOCYTE ESTERASE UR QL STRIP.AUTO: ABNORMAL
LIPASE SERPL-CCNC: 157 U/L (ref 73–393)
LYMPHOCYTES # BLD: 1.5 K/UL (ref 0.8–3.5)
LYMPHOCYTES NFR BLD: 21 % (ref 12–49)
MAGNESIUM SERPL-MCNC: 1.8 MG/DL (ref 1.6–2.4)
MCH RBC QN AUTO: 28.9 PG (ref 26–34)
MCHC RBC AUTO-ENTMCNC: 34 G/DL (ref 30–36.5)
MCV RBC AUTO: 84.9 FL (ref 80–99)
MONOCYTES # BLD: 0.6 K/UL (ref 0–1)
MONOCYTES NFR BLD: 8 % (ref 5–13)
NEUTS SEG # BLD: 4.9 K/UL (ref 1.8–8)
NEUTS SEG NFR BLD: 70 % (ref 32–75)
NITRITE UR QL STRIP.AUTO: NEGATIVE
NRBC # BLD: 0 K/UL (ref 0–0.01)
NRBC BLD-RTO: 0 PER 100 WBC
P-R INTERVAL, ECG05: 116 MS
PH UR STRIP: 6.5 [PH] (ref 5–8)
PLATELET # BLD AUTO: 228 K/UL (ref 150–400)
PMV BLD AUTO: 10.4 FL (ref 8.9–12.9)
POTASSIUM SERPL-SCNC: 3.8 MMOL/L (ref 3.5–5.1)
PROT SERPL-MCNC: 7 G/DL (ref 6.4–8.2)
PROT UR STRIP-MCNC: 30 MG/DL
Q-T INTERVAL, ECG07: 344 MS
QRS DURATION, ECG06: 68 MS
QTC CALCULATION (BEZET), ECG08: 434 MS
RBC # BLD AUTO: 4.5 M/UL (ref 3.8–5.2)
RBC #/AREA URNS HPF: ABNORMAL /HPF (ref 0–5)
SODIUM SERPL-SCNC: 138 MMOL/L (ref 136–145)
SP GR UR REFRACTOMETRY: 1.02 (ref 1–1.03)
UR CULT HOLD, URHOLD: NORMAL
UROBILINOGEN UR QL STRIP.AUTO: 0.2 EU/DL (ref 0.2–1)
VENTRICULAR RATE, ECG03: 96 BPM
WBC # BLD AUTO: 6.9 K/UL (ref 3.6–11)
WBC URNS QL MICRO: ABNORMAL /HPF (ref 0–4)
YEAST BUDDING URNS QL: PRESENT

## 2019-01-02 PROCEDURE — 80053 COMPREHEN METABOLIC PANEL: CPT

## 2019-01-02 PROCEDURE — 83690 ASSAY OF LIPASE: CPT

## 2019-01-02 PROCEDURE — 93005 ELECTROCARDIOGRAM TRACING: CPT

## 2019-01-02 PROCEDURE — 99285 EMERGENCY DEPT VISIT HI MDM: CPT

## 2019-01-02 PROCEDURE — 74011250637 HC RX REV CODE- 250/637: Performed by: OBSTETRICS & GYNECOLOGY

## 2019-01-02 PROCEDURE — 36415 COLL VENOUS BLD VENIPUNCTURE: CPT

## 2019-01-02 PROCEDURE — 85025 COMPLETE CBC W/AUTO DIFF WBC: CPT

## 2019-01-02 PROCEDURE — 74011250636 HC RX REV CODE- 250/636: Performed by: EMERGENCY MEDICINE

## 2019-01-02 PROCEDURE — 83735 ASSAY OF MAGNESIUM: CPT

## 2019-01-02 PROCEDURE — 74011250636 HC RX REV CODE- 250/636: Performed by: OBSTETRICS & GYNECOLOGY

## 2019-01-02 PROCEDURE — 99218 HC RM OBSERVATION: CPT

## 2019-01-02 PROCEDURE — 96374 THER/PROPH/DIAG INJ IV PUSH: CPT

## 2019-01-02 PROCEDURE — 96361 HYDRATE IV INFUSION ADD-ON: CPT

## 2019-01-02 PROCEDURE — 81001 URINALYSIS AUTO W/SCOPE: CPT

## 2019-01-02 PROCEDURE — 65270000029 HC RM PRIVATE

## 2019-01-02 RX ORDER — ONDANSETRON 2 MG/ML
8 INJECTION INTRAMUSCULAR; INTRAVENOUS EVERY 8 HOURS
Status: DISCONTINUED | OUTPATIENT
Start: 2019-01-02 | End: 2019-01-03

## 2019-01-02 RX ORDER — METOCLOPRAMIDE HYDROCHLORIDE 5 MG/ML
10 INJECTION INTRAMUSCULAR; INTRAVENOUS EVERY 6 HOURS
Status: DISCONTINUED | OUTPATIENT
Start: 2019-01-02 | End: 2019-01-03

## 2019-01-02 RX ORDER — KETOROLAC TROMETHAMINE 30 MG/ML
30 INJECTION, SOLUTION INTRAMUSCULAR; INTRAVENOUS
Status: DISCONTINUED | OUTPATIENT
Start: 2019-01-02 | End: 2019-01-02

## 2019-01-02 RX ORDER — ACETAMINOPHEN 325 MG/1
650 TABLET ORAL
Status: DISCONTINUED | OUTPATIENT
Start: 2019-01-02 | End: 2019-01-05 | Stop reason: HOSPADM

## 2019-01-02 RX ORDER — ONDANSETRON 2 MG/ML
4 INJECTION INTRAMUSCULAR; INTRAVENOUS
Status: COMPLETED | OUTPATIENT
Start: 2019-01-02 | End: 2019-01-02

## 2019-01-02 RX ORDER — SODIUM CHLORIDE, SODIUM LACTATE, POTASSIUM CHLORIDE, CALCIUM CHLORIDE 600; 310; 30; 20 MG/100ML; MG/100ML; MG/100ML; MG/100ML
100 INJECTION, SOLUTION INTRAVENOUS CONTINUOUS
Status: DISCONTINUED | OUTPATIENT
Start: 2019-01-02 | End: 2019-01-03

## 2019-01-02 RX ORDER — FAMOTIDINE 10 MG/ML
20 INJECTION INTRAVENOUS EVERY 12 HOURS
Status: DISCONTINUED | OUTPATIENT
Start: 2019-01-02 | End: 2019-01-03

## 2019-01-02 RX ORDER — TERCONAZOLE 4 MG/G
1 CREAM VAGINAL
Status: DISCONTINUED | OUTPATIENT
Start: 2019-01-02 | End: 2019-01-05 | Stop reason: HOSPADM

## 2019-01-02 RX ADMIN — SODIUM CHLORIDE, SODIUM LACTATE, POTASSIUM CHLORIDE, AND CALCIUM CHLORIDE 150 ML/HR: 600; 310; 30; 20 INJECTION, SOLUTION INTRAVENOUS at 17:26

## 2019-01-02 RX ADMIN — ONDANSETRON HYDROCHLORIDE 8 MG: 2 SOLUTION INTRAMUSCULAR; INTRAVENOUS at 15:29

## 2019-01-02 RX ADMIN — FAMOTIDINE 20 MG: 10 INJECTION, SOLUTION INTRAVENOUS at 18:48

## 2019-01-02 RX ADMIN — METOCLOPRAMIDE 10 MG: 5 INJECTION, SOLUTION INTRAMUSCULAR; INTRAVENOUS at 17:56

## 2019-01-02 RX ADMIN — ONDANSETRON 4 MG: 2 INJECTION INTRAMUSCULAR; INTRAVENOUS at 12:39

## 2019-01-02 RX ADMIN — ONDANSETRON HYDROCHLORIDE 8 MG: 2 SOLUTION INTRAMUSCULAR; INTRAVENOUS at 23:15

## 2019-01-02 RX ADMIN — SODIUM CHLORIDE, SODIUM LACTATE, POTASSIUM CHLORIDE, AND CALCIUM CHLORIDE 150 ML/HR: 600; 310; 30; 20 INJECTION, SOLUTION INTRAVENOUS at 23:16

## 2019-01-02 RX ADMIN — SODIUM CHLORIDE 1000 ML: 900 INJECTION, SOLUTION INTRAVENOUS at 12:42

## 2019-01-02 RX ADMIN — METOCLOPRAMIDE 10 MG: 5 INJECTION, SOLUTION INTRAMUSCULAR; INTRAVENOUS at 23:15

## 2019-01-02 RX ADMIN — TERCONAZOLE 1 APPLICATOR: 4 CREAM VAGINAL at 23:15

## 2019-01-02 RX ADMIN — SODIUM CHLORIDE, SODIUM LACTATE, POTASSIUM CHLORIDE, AND CALCIUM CHLORIDE 1000 ML: 600; 310; 30; 20 INJECTION, SOLUTION INTRAVENOUS at 14:54

## 2019-01-02 NOTE — ED TRIAGE NOTES
Pt presents from [facility] for chest pain/nausea/vomiting. Pt reports she is 13 weeks pregnant and has had nausea and vomiting, reports her chest hurts following vomiting. Per pt- OB sent for EKG and to go to L&D following ED clearance.

## 2019-01-02 NOTE — PROGRESS NOTES
1400 Patient arrives to L&D, and is changed into gown. Patient oriented to room and unit. Doppler FHM, 150. Patient reports while, moderate vaginal discharge, and patient in vomiting several times. The emesis is every time a small amount of liquid consistency. Urine sample sent to lab. 1519 This RN contacts Dr. Mesha Olea, and reports to MD the vaginal discharge. Dr. Mesha Olea recommends over the counter Monostat. Will not treat in the hospital. No other interventions ordered at this time. 1900 Verbal shift change report given to MILTON Aburto RN (oncoming nurse) by IWONA Eddy (offgoing nurse). Report included the following information SBAR, Kardex, Intake/Output, MAR, Accordion and Recent Results.

## 2019-01-02 NOTE — H&P
Gynecology History and Physical 
 
Name: Emerson Velez MRN: [id number] SSN: [id number] YOB: 1995  Age: 24 y.o. Sex: female Subjective: Chief complaint: Severe nausea/vomiting of pregnancy Omar Guerrero is a 24 y.o.   female at 13 weeks 2 days GA with a history of continued nausea/vomiting of pregnancy, unable to keep down food/liquid. 3+ ketones in the office today. Ultrasound findings include IUP, see office notes. Previous treatment measures include 4 ER visits for IV hydration and trial on several antiemetic and antihistamine medications. She gets relief from 8mg Zofran for an hour before nausea/vomiting returns. Phenergan makes her dizzy and dimenhydrinate did not help. OB History  Para Term  AB Living 1 SAB TAB Ectopic Molar Multiple Live Births Past Medical History:  
Diagnosis Date  Sinusitis  Vitamin D deficiency No past surgical history on file. Social History Occupational History  Not on file Tobacco Use  Smoking status: Never Smoker  Smokeless tobacco: Never Used Substance and Sexual Activity  Alcohol use: No  
 Drug use: No  
 Sexual activity: Yes  
  Partners: Male Birth control/protection: None No family history on file. No Known Allergies Prior to Admission medications Medication Sig Start Date End Date Taking? Authorizing Provider  
ondansetron (ZOFRAN ODT) 4 mg disintegrating tablet Take 1 Tab by mouth every eight (8) hours as needed for Nausea. 18  Yes Ova ARMIN Sharp Review of Systems A comprehensive review of systems was negative except for that written in the History of Present Illness. Objective:  
 
Vitals:  
 19 1502 19 1528 19 1533 19 1538 BP: 120/83 Pulse: 76 Resp:      
Temp:      
SpO2:  100% 100% 100% Weight:      
Height:      
 
 
Physical Exam: 
Deferred Assessment/Plan:  
 
Severe nausea/vomiting of pregnancy at 13 weeks GA, 24 hour observation 1. Admit for IV hydration 2. Bowel rest; NPO with ice chips/sips of clears only. 3.  Repeat BMP and UA in am with thyroid panel. 4.  Zofran and Reglan around the clock. 5.  FHT q shift. 6.  Strict I/O. Signed By:  Charley Carrion DO   
 January 2, 2019

## 2019-01-02 NOTE — ED PROVIDER NOTES
HPI  
 
  24y F at 13w gestation here with vomiting, nausea, and chest pain. Reports sx's are only present when the vomiting is bad. When she is not having nausea or vomiting she feels ok. Saw her ob today who sent her to the ED for an ECG and then wants her to go to L&D for IVF. No fever. No abd pain. No back or flank pain. No urinary complaints. No recent illnesses. Past Medical History:  
Diagnosis Date  Sinusitis  Vitamin D deficiency No past surgical history on file. No family history on file. Social History Socioeconomic History  Marital status:  Spouse name: Not on file  Number of children: Not on file  Years of education: Not on file  Highest education level: Not on file Social Needs  Financial resource strain: Not on file  Food insecurity - worry: Not on file  Food insecurity - inability: Not on file  Transportation needs - medical: Not on file  Transportation needs - non-medical: Not on file Occupational History  Not on file Tobacco Use  Smoking status: Never Smoker  Smokeless tobacco: Never Used Substance and Sexual Activity  Alcohol use: No  
 Drug use: No  
 Sexual activity: Yes  
  Partners: Male Birth control/protection: None Other Topics Concern  Not on file Social History Narrative  Not on file ALLERGIES: Patient has no known allergies. Review of Systems Review of Systems Constitutional: (-) weight loss. HEENT: (-) stiff neck Eyes: (-) discharge. Respiratory: (-) cough. Cardiovascular: (-) syncope. Gastrointestinal: (-) blood in stool. Genitourinary: (-) hematuria. Musculoskeletal: (-) myalgias. Neurological: (-) seizure. Skin: (-) petechiae Lymph/Immunologic: (-) enlarged lymph nodes All other systems reviewed and are negative. Vitals:  
 01/02/19 1208 BP: 116/75 Pulse: (!) 108 Resp: 18 SpO2: 100% Weight: 49.9 kg (110 lb) Height: 5' 2\" (1.575 m) Physical Exam Nursing note and vitals reviewed. Constitutional: oriented to person, place, and time. appears well-developed and well-nourished. No distress. Head: Normocephalic and atraumatic. Sclera anicteric Nose: No rhinorrhea Mouth/Throat: Oropharynx is clear and moist. Pharynx normal 
Eyes: Conjunctivae are normal. Pupils are equal, round, and reactive to light. Right eye exhibits no discharge. Left eye exhibits no discharge. Neck: Painless normal range of motion. Neck supple. No LAD. Cardiovascular: Normal rate, regular rhythm, normal heart sounds and intact distal pulses. Exam reveals no gallop and no friction rub. No murmur heard. Pulmonary/Chest:  No respiratory distress. No wheezes. No rales. No rhonchi. No increased work of breathing. No accessory muscle use. Good air exchange throughout. Abdominal: soft, non-tender, no rebound or guarding. No hepatosplenomegaly. Normal bowel sounds throughout. Back: no tenderness to palpation, no deformities, no CVA tenderness Extremities/Musculoskeletal: Normal range of motion. no tenderness. No edema. Distal extremities are neurovasc intact. Lymphadenopathy:   No adenopathy. Neurological:  Alert and oriented to person, place, and time. Coordination normal. CN 2-12 intact. Motor and sensory function intact. Skin: Skin is warm and dry. No rash noted. No pallor. MDM 24y F here with vomiting and chest pain. Will check ECG, labs, give fluids and zofran. Will need to go to L&D per her ob once ECG done (and normal). Procedures 12:56 PM 
Pt's ob called ahead and wants her admitted. Given her chest pain is only present with vomiting, it seems the two are related. Her ECG is ok. ED EKG interpretation: 
Rhythm: normal sinus rhythm; and regular . Rate (approx.): 96; Axis: normal; P wave: normal; QRS interval: normal ; ST/T wave: normal;  This EKG was interpreted by Evelia Bae MD,ED Provider.

## 2019-01-02 NOTE — PROGRESS NOTES
- This RN orienting Jama Mg RN, charting and care performed by IWONA Eddy RN will be overseen by this RN. 1800: Dr. Meng Chery updated on urine results.

## 2019-01-02 NOTE — PROGRESS NOTES
01/02/19 3:58 PM 
CM met with patient, who was present with her /FOB Jose Angel Chavez ([phone]), to discuss obs status. Patient currently 13w pregnant and here for hyperemesis. Cleared in ED earlier today. Receiving IV fluids. Obs letter provided and discussed; no concerns or questions regarding this. Will discharge home once cleared with outpatient follow up with [facility]. Care Management Interventions PCP Verified by CM: Yes Mode of Transport at Discharge: Self Transition of Care Consult (CM Consult): Discharge Planning Current Support Network: Lives with Spouse, Family Lives [place] Confirm Follow Up Transport: Family Plan discussed with Pt/Family/Caregiver: Yes Discharge Location Discharge Placement: Home with outpatient services AKIRA Francisco

## 2019-01-03 LAB
ANION GAP SERPL CALC-SCNC: 13 MMOL/L (ref 5–15)
APPEARANCE UR: ABNORMAL
BACTERIA URNS QL MICRO: ABNORMAL /HPF
BILIRUB UR QL: NEGATIVE
BUN SERPL-MCNC: 9 MG/DL (ref 6–20)
BUN/CREAT SERPL: 16 (ref 12–20)
CALCIUM SERPL-MCNC: 8.2 MG/DL (ref 8.5–10.1)
CHLORIDE SERPL-SCNC: 105 MMOL/L (ref 97–108)
CO2 SERPL-SCNC: 17 MMOL/L (ref 21–32)
COLOR UR: ABNORMAL
CREAT SERPL-MCNC: 0.55 MG/DL (ref 0.55–1.02)
EPITH CASTS URNS QL MICRO: ABNORMAL /LPF
GLUCOSE SERPL-MCNC: 56 MG/DL (ref 65–100)
GLUCOSE UR STRIP.AUTO-MCNC: NEGATIVE MG/DL
HGB UR QL STRIP: NEGATIVE
KETONES UR QL STRIP.AUTO: >80 MG/DL
KETONES UR QL: ABNORMAL MG/DL
LEUKOCYTE ESTERASE UR QL STRIP.AUTO: ABNORMAL
MUCOUS THREADS URNS QL MICRO: ABNORMAL /LPF
NITRITE UR QL STRIP.AUTO: NEGATIVE
PH UR STRIP: 6 [PH] (ref 5–8)
POTASSIUM SERPL-SCNC: 4 MMOL/L (ref 3.5–5.1)
PROT UR STRIP-MCNC: ABNORMAL MG/DL
RBC #/AREA URNS HPF: ABNORMAL /HPF (ref 0–5)
SODIUM SERPL-SCNC: 135 MMOL/L (ref 136–145)
SP GR UR REFRACTOMETRY: 1.02 (ref 1–1.03)
T4 FREE SERPL-MCNC: 1.4 NG/DL (ref 0.8–1.5)
TSH SERPL DL<=0.05 MIU/L-ACNC: 2.25 UIU/ML (ref 0.36–3.74)
UA: UC IF INDICATED,UAUC: ABNORMAL
UROBILINOGEN UR QL STRIP.AUTO: 0.2 EU/DL (ref 0.2–1)
WBC URNS QL MICRO: ABNORMAL /HPF (ref 0–4)
YEAST BUDDING URNS QL: PRESENT

## 2019-01-03 PROCEDURE — 80048 BASIC METABOLIC PNL TOTAL CA: CPT

## 2019-01-03 PROCEDURE — 87086 URINE CULTURE/COLONY COUNT: CPT

## 2019-01-03 PROCEDURE — 84439 ASSAY OF FREE THYROXINE: CPT

## 2019-01-03 PROCEDURE — 74011250636 HC RX REV CODE- 250/636: Performed by: OBSTETRICS & GYNECOLOGY

## 2019-01-03 PROCEDURE — 36415 COLL VENOUS BLD VENIPUNCTURE: CPT

## 2019-01-03 PROCEDURE — 74011250637 HC RX REV CODE- 250/637: Performed by: OBSTETRICS & GYNECOLOGY

## 2019-01-03 PROCEDURE — 84443 ASSAY THYROID STIM HORMONE: CPT

## 2019-01-03 PROCEDURE — 81001 URINALYSIS AUTO W/SCOPE: CPT

## 2019-01-03 PROCEDURE — 81002 URINALYSIS NONAUTO W/O SCOPE: CPT

## 2019-01-03 PROCEDURE — 74011000258 HC RX REV CODE- 258: Performed by: OBSTETRICS & GYNECOLOGY

## 2019-01-03 PROCEDURE — 99218 HC RM OBSERVATION: CPT

## 2019-01-03 PROCEDURE — 65270000029 HC RM PRIVATE

## 2019-01-03 RX ORDER — DEXTROSE MONOHYDRATE AND SODIUM CHLORIDE 5; .45 G/100ML; G/100ML
100 INJECTION, SOLUTION INTRAVENOUS CONTINUOUS
Status: DISCONTINUED | OUTPATIENT
Start: 2019-01-03 | End: 2019-01-03

## 2019-01-03 RX ORDER — SIMETHICONE 80 MG
80 TABLET,CHEWABLE ORAL
Status: DISCONTINUED | OUTPATIENT
Start: 2019-01-03 | End: 2019-01-05 | Stop reason: HOSPADM

## 2019-01-03 RX ORDER — ADHESIVE BANDAGE
30 BANDAGE TOPICAL DAILY PRN
Status: DISCONTINUED | OUTPATIENT
Start: 2019-01-03 | End: 2019-01-05 | Stop reason: HOSPADM

## 2019-01-03 RX ORDER — METOCLOPRAMIDE HYDROCHLORIDE 5 MG/ML
5 INJECTION INTRAMUSCULAR; INTRAVENOUS
Status: DISCONTINUED | OUTPATIENT
Start: 2019-01-03 | End: 2019-01-05 | Stop reason: HOSPADM

## 2019-01-03 RX ORDER — ONDANSETRON 4 MG/1
8 TABLET, ORALLY DISINTEGRATING ORAL
Status: DISCONTINUED | OUTPATIENT
Start: 2019-01-03 | End: 2019-01-04

## 2019-01-03 RX ORDER — FAMOTIDINE 20 MG/1
40 TABLET, FILM COATED ORAL 2 TIMES DAILY
Status: DISCONTINUED | OUTPATIENT
Start: 2019-01-03 | End: 2019-01-05 | Stop reason: HOSPADM

## 2019-01-03 RX ADMIN — SIMETHICONE CHEW TAB 80 MG 80 MG: 80 TABLET ORAL at 21:16

## 2019-01-03 RX ADMIN — ONDANSETRON 8 MG: 4 TABLET, ORALLY DISINTEGRATING ORAL at 22:55

## 2019-01-03 RX ADMIN — FAMOTIDINE 20 MG: 10 INJECTION, SOLUTION INTRAVENOUS at 09:30

## 2019-01-03 RX ADMIN — METOCLOPRAMIDE 10 MG: 5 INJECTION, SOLUTION INTRAMUSCULAR; INTRAVENOUS at 06:27

## 2019-01-03 RX ADMIN — ONDANSETRON HYDROCHLORIDE 8 MG: 2 SOLUTION INTRAMUSCULAR; INTRAVENOUS at 15:33

## 2019-01-03 RX ADMIN — ONDANSETRON HYDROCHLORIDE 8 MG: 2 SOLUTION INTRAMUSCULAR; INTRAVENOUS at 06:27

## 2019-01-03 RX ADMIN — FAMOTIDINE 40 MG: 20 TABLET ORAL at 17:58

## 2019-01-03 RX ADMIN — SODIUM CHLORIDE, SODIUM LACTATE, POTASSIUM CHLORIDE, AND CALCIUM CHLORIDE 150 ML/HR: 600; 310; 30; 20 INJECTION, SOLUTION INTRAVENOUS at 05:36

## 2019-01-03 RX ADMIN — METOCLOPRAMIDE 10 MG: 5 INJECTION, SOLUTION INTRAMUSCULAR; INTRAVENOUS at 12:58

## 2019-01-03 RX ADMIN — MAGNESIUM HYDROXIDE 30 ML: 400 SUSPENSION ORAL at 21:19

## 2019-01-03 RX ADMIN — DEXTROSE MONOHYDRATE AND SODIUM CHLORIDE 100 ML/HR: 5; .45 INJECTION, SOLUTION INTRAVENOUS at 09:30

## 2019-01-03 NOTE — PROGRESS NOTES
Nutrition Assessment: 
 
RECOMMENDATIONS/INTERVENTION(S):  
1. Continue to advance diet as tolerated to regular 2. Encourage small frequent meals of high kcal/protien foods with decreased appetite. 3. Continue prenatal vitamin micronutrient supplementation. Additional supplementation of 25-30 mg pyridoxine daily may also help with nausea. 3. Monitor diet tolerance and progression, wt, labs. May need oral nutrition supplements. ASSESSMENT:  
1/3: Received MST referral for 14-23lbs wt loss and decreased intake due to appetite. Pt 13 weeks 3 days gestational age with continued N/V. PMHx of vit D deficiency and sinusitis. Pt has visited the ER x 4 for IVF and antiemetic medication. Pt reports 16 lbs wt loss (12% body wt) in the past 5 weeks due to chief complaint. Eating well prior to pregnancy but lately has been only eating \"because she has to.\". Food tolerance variable, no foods consistently better tolerated but water make nausea worst. No food allergies or intolerances. Discussed nutrition therapy for nausea with pregnancy, including small frequent meals, foods to promote tolerance, and oral nutrition supplements. Provided handout with pt education material. Diet advanced to clear liquids this morning. No further emesis thus far, pt reported the soup/broth went well. Na 135, K+ and mag WNL. Urinary ketones >80. Trace urinary protein. Diet Order: Clear liquids 
% Eaten:   
Patient Vitals for the past 72 hrs: 
 % Diet Eaten 01/03/19 1050 75 % Pertinent Medications: [x] Reviewed Chemistries: [x] Reviewed Anthropometrics: Height: 5' 2\" (157.5 cm) Weight: 49.9 kg (110 lb) IBW (%IBW):   ( ) UBW (%UBW): 57.2 kg (126 lb) (  %) BMI: Body mass index is 20.12 kg/m². This BMI is indicative of: 
 [] Underweight    [x] Normal    [] Overweight    []  Obesity    []  Extreme Obesity (BMI>40) Estimated Nutrition Needs (Based on): 1923 Kcals/day(BMR 1202 x 1.6 AF ) , 55 g(-65 (1.1-1.3g/kg ABW)) Protein Carbohydrate: At Least 130 g/day  Fluids: 1900 ml(1mL/kcal) Last BM: 12/31        Bowel Sounds:  active Skin:    [x] Intact   [] Incision  [] Breakdown   [] DTI   [] Tears/Excoriation/Abrasion  []Edema [] Other: Wt Readings from Last 30 Encounters:  
01/02/19 49.9 kg (110 lb) 12/29/18 52.6 kg (116 lb) 12/01/18 56.7 kg (125 lb)  
11/17/18 56.7 kg (125 lb) 11/09/14 48 kg (105 lb 14.4 oz) 10/10/14 47.6 kg (105 lb) NUTRITION DIAGNOSES:  
Problem:  Unintended weight loss Etiology: related to inadequate energy intake with N/V Signs/Symptoms: as evidenced by 12% wt loss in 5 weeks NUTRITION INTERVENTIONS: 
Meals/Snacks: General/healthful diet           Comprehensive Nutrition Education: Purpose of nutrition education GOAL:  
maintain wt >/= 49.9kg, diet to advance past clear liquids with good tolerance in the next 3-5 days Cultural, Judaism, or Ethnic Dietary Needs: None EDUCATION & DISCHARGE NEEDS:  
 [] None Identified 
 [x] Identified and Education Provided/Documented 
 [] Identified and Pt declined/was not appropriate [x] Interdisciplinary Care Plan Reviewed/Documented  
 [x] Discharge Needs: Regular diet as tolerated, with small frequent meals. High kcal/protein snacks or oral nutrition supplements PRN. [] No Nutrition Related Discharge Needs NUTRITION RISK:  
Pt Is At Nutrition Risk  [x] No Nutrition Risk Identified  [] PT SEEN FOR:  
 []  MD Consult: []Calorie Count []Diabetic Diet Education []Diet Education []Electrolyte Management []General Nutrition Management and Supplements []Management of Tube Feeding []TPN Recommendations [x]  RN Referral:  [x]MST score >=2 
   []Enteral/Parenteral Nutrition PTA []Pregnant: Gestational DM or Multigestation  
              [] Pressure Ulcer 
 
[]  Low BMI      []  Length of Stay       [] Dysphagia Diet         [] Ventilator []  Follow-up Previous Recommendations: 
 [] Implemented          [] Not Implemented          [x] Not Applicable Previous Goal: 
 [] Met              [] Progressing Towards Goal              [] Not Progressing Towards Goal   [x] Not Applicable Nader Posada RD Pager [phone] Office [phone]

## 2019-01-03 NOTE — PROGRESS NOTES
Spoke with MD PEACE Patel to advance diet per dietitian suggestion for high protein/high lizet bland diet and nutritional supplements inbetween. Encourage patient to intake small frequent meals throughout the day. Giving patient applesauce and crackers at this time to advance diet as patient \"does not like\" the clear liquid tray and would like to attempt to advance diet. Primary RN informed to call Dr. Arnulfo Machado when available.

## 2019-01-03 NOTE — PROGRESS NOTES
1900- Bedside report received. Pt sleeping on right side. VSS. 
2100- Pt states feeling better. Given ice chips and tolerated well. 2300- Pt feeling hungry. Given gingerale and saltines. Instructed to only take sips and take it slow. 0030- Pt sleeping. 2497- bloodwork drawn and sent to lab. Pt instructed on technique for clean catch and will give urine sample next time she has to go.

## 2019-01-03 NOTE — PROGRESS NOTES
01/03/19 10:27 AM 
CM visited with patient today for follow up. Plan continues for IV hydration and advancing diet. CM provided patient with a pediatrician list, as this was requested by patient and her . Patient denied additional needs at this time.  
AKIRA Greco

## 2019-01-03 NOTE — PROGRESS NOTES
[facility] Progress Note Peggy Whitaker 13w3d Patient admitted for hyperemesis gravidarum 13w3d Estimated Date of Delivery: 7/8/19 Has been seen in ER multiple times for IV hydration. Admitted yesterday evening and had no episodes of vomiting overnight. Has been NPO on IV meds. States feels mildly hungry this morning. Has been trying to eat anything, no specific dietary changes. Denies pain this AM. Vitals:   
Patient Vitals for the past 24 hrs: 
 BP Temp Pulse Resp SpO2 Height Weight 01/03/19 0535 125/61  91      
01/02/19 2003 116/67 99.6 °F (37.6 °C) 88 16 100 %    
01/02/19 2002 116/67        
01/02/19 1903     100 %    
01/02/19 1902 102/56  93      
01/02/19 1845  99.3 °F (37.4 °C)       
01/02/19 1838     100 %    
01/02/19 1833     100 %    
01/02/19 1828     100 %    
01/02/19 1823     100 %    
01/02/19 1818     100 %    
01/02/19 1813     100 %    
01/02/19 1808     100 %    
01/02/19 1803     100 %    
01/02/19 1802 91/71  97      
01/02/19 1758     100 %    
01/02/19 1753     100 %    
01/02/19 1748     100 %    
01/02/19 1743     100 %    
01/02/19 1738     100 %    
01/02/19 1733     100 %    
01/02/19 1728     100 %    
01/02/19 1723     100 %    
01/02/19 1718     100 %    
01/02/19 1713     100 %    
01/02/19 1708     100 %    
01/02/19 1703     100 %    
01/02/19 1702 123/68 99.1 °F (37.3 °C) 80 16 100 %    
01/02/19 1658     100 %    
01/02/19 1653     100 %    
01/02/19 1648     100 %    
01/02/19 1643     100 %    
01/02/19 1638     100 %    
01/02/19 1633     100 %    
01/02/19 1628     100 %    
01/02/19 1623     100 %    
01/02/19 1618     100 %    
01/02/19 1613     100 %    
01/02/19 1608     100 %    
01/02/19 1603     100 %   19 1602 125/72  84      
19 1558     100 %    
19 1553     100 %    
19 1548     100 %    
19 1543     100 %    
19 1538     100 %    
19 1533     100 %    
19 1528     100 %    
19 1502 120/83  76      
19 1408      5' 2\" (1.575 m) 49.9 kg (110 lb) 19 1400 113/68 97.6 °F (36.4 °C) 93 18 100 %    
19 1208 116/75 98.1 °F (36.7 °C) (!) 108 18 100 % 5' 2\" (1.575 m) 49.9 kg (110 lb) Temp (24hrs), Av.7 °F (37.1 °C), Min:97.6 °F (36.4 °C), Max:99.6 °F (37.6 °C) I&O:   No intake/output data recorded.  1901 -  0700 In: 1542.5 [I.V.:1542.5] Out: 650 [Urine:550] Exam:  Patient without distress. Abdomen soft, non-tender Fundus soft and non tender Lower extremities edema No 
                 
             
    
   
Labs:  
Recent Results (from the past 24 hour(s)) CBC WITH AUTOMATED DIFF Collection Time: 19 12:33 PM  
Result Value Ref Range WBC 6.9 3.6 - 11.0 K/uL  
 RBC 4.50 3.80 - 5.20 M/uL  
 HGB 13.0 11.5 - 16.0 g/dL HCT 38.2 35.0 - 47.0 % MCV 84.9 80.0 - 99.0 FL  
 MCH 28.9 26.0 - 34.0 PG  
 MCHC 34.0 30.0 - 36.5 g/dL  
 RDW 13.8 11.5 - 14.5 % PLATELET 310 852 - 001 K/uL MPV 10.4 8.9 - 12.9 FL  
 NRBC 0.0 0  WBC ABSOLUTE NRBC 0.00 0.00 - 0.01 K/uL NEUTROPHILS 70 32 - 75 % LYMPHOCYTES 21 12 - 49 % MONOCYTES 8 5 - 13 % EOSINOPHILS 0 0 - 7 % BASOPHILS 0 0 - 1 % IMMATURE GRANULOCYTES 0 0.0 - 0.5 % ABS. NEUTROPHILS 4.9 1.8 - 8.0 K/UL  
 ABS. LYMPHOCYTES 1.5 0.8 - 3.5 K/UL  
 ABS. MONOCYTES 0.6 0.0 - 1.0 K/UL  
 ABS. EOSINOPHILS 0.0 0.0 - 0.4 K/UL  
 ABS. BASOPHILS 0.0 0.0 - 0.1 K/UL  
 ABS. IMM. GRANS. 0.0 0.00 - 0.04 K/UL  
 DF AUTOMATED METABOLIC PANEL, COMPREHENSIVE Collection Time: 19 12:33 PM  
Result Value Ref Range Sodium 138 136 - 145 mmol/L Potassium 3.8 3.5 - 5.1 mmol/L Chloride 104 97 - 108 mmol/L  
 CO2 21 21 - 32 mmol/L Anion gap 13 5 - 15 mmol/L Glucose 77 65 - 100 mg/dL BUN 10 6 - 20 MG/DL Creatinine 0.62 0.55 - 1.02 MG/DL  
 BUN/Creatinine ratio 16 12 - 20 GFR est AA >60 >60 ml/min/1.73m2 GFR est non-AA >60 >60 ml/min/1.73m2 Calcium 9.0 8.5 - 10.1 MG/DL Bilirubin, total 0.7 0.2 - 1.0 MG/DL  
 ALT (SGPT) 41 12 - 78 U/L  
 AST (SGOT) 32 15 - 37 U/L Alk. phosphatase 39 (L) 45 - 117 U/L Protein, total 7.0 6.4 - 8.2 g/dL Albumin 3.3 (L) 3.5 - 5.0 g/dL Globulin 3.7 2.0 - 4.0 g/dL A-G Ratio 0.9 (L) 1.1 - 2.2 LIPASE Collection Time: 01/02/19 12:33 PM  
Result Value Ref Range Lipase 157 73 - 393 U/L MAGNESIUM Collection Time: 01/02/19 12:33 PM  
Result Value Ref Range Magnesium 1.8 1.6 - 2.4 mg/dL EKG, 12 LEAD, INITIAL Collection Time: 01/02/19 12:38 PM  
Result Value Ref Range Ventricular Rate 96 BPM  
 Atrial Rate 96 BPM  
 P-R Interval 116 ms  
 QRS Duration 68 ms Q-T Interval 344 ms QTC Calculation (Bezet) 434 ms Calculated P Axis 83 degrees Calculated R Axis 79 degrees Calculated T Axis 20 degrees Diagnosis Normal sinus rhythm with sinus arrhythmia Normal ECG No previous ECGs available Confirmed by Chau Wilson MD., Chidi ([id number]) on 1/2/2019 5:35:35 PM 
  
URINALYSIS W/MICROSCOPIC Collection Time: 01/02/19  2:53 PM  
Result Value Ref Range Color DARK YELLOW Appearance CLOUDY (A) CLEAR Specific gravity 1.025 1.003 - 1.030    
 pH (UA) 6.5 5.0 - 8.0 Protein 30 (A) NEG mg/dL Glucose NEGATIVE  NEG mg/dL Ketone >80 (A) NEG mg/dL Bilirubin NEGATIVE  NEG Blood NEGATIVE  NEG Urobilinogen 0.2 0.2 - 1.0 EU/dL Nitrites NEGATIVE  NEG Leukocyte Esterase SMALL (A) NEG    
 WBC 10-20 0 - 4 /hpf  
 RBC 0-5 0 - 5 /hpf Epithelial cells MODERATE (A) FEW /lpf  Bacteria 2+ (A) NEG /hpf  
 Budding yeast PRESENT (A) NEG URINE CULTURE HOLD SAMPLE Collection Time: 01/02/19  2:53 PM  
Result Value Ref Range Urine culture hold URINE ON HOLD IN MICROBIOLOGY DEPT FOR 3 DAYS. IF UNPRESERVED URINE IS SUBMITTED, IT CANNOT BE USED FOR ADDITIONAL TESTING AFTER 24 HRS, RECOLLECTION WILL BE REQUIRED. METABOLIC PANEL, BASIC Collection Time: 01/03/19  5:32 AM  
Result Value Ref Range Sodium 135 (L) 136 - 145 mmol/L Potassium 4.0 3.5 - 5.1 mmol/L Chloride 105 97 - 108 mmol/L  
 CO2 17 (L) 21 - 32 mmol/L Anion gap 13 5 - 15 mmol/L Glucose 56 (L) 65 - 100 mg/dL BUN 9 6 - 20 MG/DL Creatinine 0.55 0.55 - 1.02 MG/DL  
 BUN/Creatinine ratio 16 12 - 20 GFR est AA >60 >60 ml/min/1.73m2 GFR est non-AA >60 >60 ml/min/1.73m2 Calcium 8.2 (L) 8.5 - 10.1 MG/DL  
TSH 3RD GENERATION Collection Time: 01/03/19  5:32 AM  
Result Value Ref Range TSH 2.25 0.36 - 3.74 uIU/mL Assessment and Plan:   IUP @ 13w3d Patient Active Problem List  
 Diagnosis Date Noted  Nausea and vomiting in pregnancy 01/02/2019 Hyperemesis Gravidarum:  Antiemetic Therapy including Phenergan/Zofran/Reglan/Zantac Aggressive intravenous hydration Check urine for ketones until trace results 
 
- CMP pending 
- advance to clears 
- cont IV meds until tolerating clears - IVF @ 100cc/hr Juana Nunez MD 
[facility]

## 2019-01-04 PROBLEM — O21.0 HYPEREMESIS GRAVIDARUM: Status: ACTIVE | Noted: 2019-01-04

## 2019-01-04 LAB
ANION GAP SERPL CALC-SCNC: 12 MMOL/L (ref 5–15)
BACTERIA SPEC CULT: NORMAL
BUN SERPL-MCNC: 4 MG/DL (ref 6–20)
BUN/CREAT SERPL: 7 (ref 12–20)
CALCIUM SERPL-MCNC: 8.2 MG/DL (ref 8.5–10.1)
CC UR VC: NORMAL
CHLORIDE SERPL-SCNC: 105 MMOL/L (ref 97–108)
CO2 SERPL-SCNC: 20 MMOL/L (ref 21–32)
CREAT SERPL-MCNC: 0.58 MG/DL (ref 0.55–1.02)
GLUCOSE SERPL-MCNC: 79 MG/DL (ref 65–100)
KETONES UR QL: >80 MG/DL
POTASSIUM SERPL-SCNC: 3.7 MMOL/L (ref 3.5–5.1)
SERVICE CMNT-IMP: NORMAL
SODIUM SERPL-SCNC: 137 MMOL/L (ref 136–145)

## 2019-01-04 PROCEDURE — 74011250637 HC RX REV CODE- 250/637: Performed by: OBSTETRICS & GYNECOLOGY

## 2019-01-04 PROCEDURE — 80048 BASIC METABOLIC PNL TOTAL CA: CPT

## 2019-01-04 PROCEDURE — 74011250636 HC RX REV CODE- 250/636: Performed by: OBSTETRICS & GYNECOLOGY

## 2019-01-04 PROCEDURE — 36415 COLL VENOUS BLD VENIPUNCTURE: CPT

## 2019-01-04 PROCEDURE — 81002 URINALYSIS NONAUTO W/O SCOPE: CPT

## 2019-01-04 PROCEDURE — 65270000029 HC RM PRIVATE

## 2019-01-04 RX ORDER — ZOLPIDEM TARTRATE 5 MG/1
5 TABLET ORAL
Status: DISCONTINUED | OUTPATIENT
Start: 2019-01-04 | End: 2019-01-05 | Stop reason: HOSPADM

## 2019-01-04 RX ORDER — ONDANSETRON 4 MG/1
8 TABLET, ORALLY DISINTEGRATING ORAL EVERY 6 HOURS
Status: DISCONTINUED | OUTPATIENT
Start: 2019-01-04 | End: 2019-01-05 | Stop reason: HOSPADM

## 2019-01-04 RX ORDER — SODIUM CHLORIDE, SODIUM LACTATE, POTASSIUM CHLORIDE, CALCIUM CHLORIDE 600; 310; 30; 20 MG/100ML; MG/100ML; MG/100ML; MG/100ML
125 INJECTION, SOLUTION INTRAVENOUS CONTINUOUS
Status: DISCONTINUED | OUTPATIENT
Start: 2019-01-04 | End: 2019-01-05 | Stop reason: HOSPADM

## 2019-01-04 RX ORDER — ONDANSETRON 4 MG/1
8 TABLET, ORALLY DISINTEGRATING ORAL EVERY 8 HOURS
Status: DISCONTINUED | OUTPATIENT
Start: 2019-01-04 | End: 2019-01-04

## 2019-01-04 RX ADMIN — ZOLPIDEM TARTRATE 5 MG: 5 TABLET ORAL at 23:48

## 2019-01-04 RX ADMIN — ONDANSETRON 8 MG: 4 TABLET, ORALLY DISINTEGRATING ORAL at 21:08

## 2019-01-04 RX ADMIN — TERCONAZOLE 1 APPLICATOR: 4 CREAM VAGINAL at 22:36

## 2019-01-04 RX ADMIN — METOCLOPRAMIDE 5 MG: 5 INJECTION, SOLUTION INTRAMUSCULAR; INTRAVENOUS at 15:34

## 2019-01-04 RX ADMIN — SIMETHICONE CHEW TAB 80 MG 80 MG: 80 TABLET ORAL at 11:43

## 2019-01-04 RX ADMIN — METOCLOPRAMIDE 5 MG: 5 INJECTION, SOLUTION INTRAMUSCULAR; INTRAVENOUS at 21:08

## 2019-01-04 RX ADMIN — SODIUM CHLORIDE, SODIUM LACTATE, POTASSIUM CHLORIDE, AND CALCIUM CHLORIDE 125 ML/HR: 600; 310; 30; 20 INJECTION, SOLUTION INTRAVENOUS at 19:13

## 2019-01-04 RX ADMIN — ONDANSETRON 8 MG: 4 TABLET, ORALLY DISINTEGRATING ORAL at 08:47

## 2019-01-04 RX ADMIN — FAMOTIDINE 40 MG: 20 TABLET ORAL at 18:14

## 2019-01-04 RX ADMIN — ONDANSETRON 8 MG: 4 TABLET, ORALLY DISINTEGRATING ORAL at 15:14

## 2019-01-04 RX ADMIN — SODIUM CHLORIDE, SODIUM LACTATE, POTASSIUM CHLORIDE, AND CALCIUM CHLORIDE 250 ML: 600; 310; 30; 20 INJECTION, SOLUTION INTRAVENOUS at 13:46

## 2019-01-04 RX ADMIN — ACETAMINOPHEN 650 MG: 325 TABLET, FILM COATED ORAL at 06:35

## 2019-01-04 RX ADMIN — FAMOTIDINE 40 MG: 20 TABLET ORAL at 09:15

## 2019-01-04 NOTE — ROUTINE PROCESS
0700: Bedside, Verbal and Written shift change report given to CATARINA Monaco (oncoming nurse) by ROYAL Vallecillo, RADHA (offgoing nurse). Report included the following information SBAR, Kardex, Intake/Output, MAR, Accordion and Recent Results. Pt noted to be sleeping at this time, regular respirations noted 6865: This RN visualizing pt to be sleeping at this time, regular respirations noted 0830: Dr. Jacques Leal at pt bedside at this time assessing pt. MD VORB zofran ODT q 6 hours scheduled, take out IV, doppler fetal heart tones q 24 hours 1128: This RN visualizing pt vomitting and dry heaving in pt bed, 50-75mL vomit noted. Pt stating she feels slightly better, but is still experiencing slight lower left abdominal/back pain 1154: This RN calling Dr. Jacques Leal to update on pt status, MD to call this RN back 1156: Dr. Jacques Leal update on pt status, pain, and vomitting over phone, MD HAND start IV and administer 250mL LR fluid bolus, then continuous at 125mL/hr due to pt vomitting episode 1209: This RN at pt bedside for IV placement and fluid administration at this time. Pt refusing IV placement, pt stating \"I am not okay with getting and IV right now. I want to try to drink fluid first.\" This RN educating pt on IV fluid administration. Pt agreeing to IV placement in 1 hour after trial with PO fluids 1300: Pt informing this RN that she vomited following PO hydration, pt  agreeing to IV placement at this time

## 2019-01-04 NOTE — PHYSICIAN ADVISORY
Letter of Admission Status Determination: Upgrade to Inpatient Sissy Durbin was originally hospitalized as Outpatient Observation status on 1/2/2019. On day 2, ongoing hospitalization is warranted for this patient with persistent symptoms requiring IV antiemetics, IV hydration, further evaluation, and close clinical monitoring. Based on the documented clinical condition and care plan, we recommend upgrading patient's hospitalization status to INPATIENT.  
  
The final decision regarding the patient's hospitalization status depends on the attending physician's judgment. Radha Lazo MD, NAM, [facility], [facility], [facility]QM[facility]ADV Physician Advisor [address] [place] [address],[address] [phone]

## 2019-01-04 NOTE — PROGRESS NOTES
1900 Assumed care of pt at this time from [address] [place] [address], EDIL Lopez RN. 
2105 Pt called out, RN to room, pt complaining of stomach pain that is burning and \"feels like the stomach flu. \"  
2110 Spoke to Dr. Destin Walton, orders received. 0700 Bedside shift change report given to CATARINA Francis (oncoming nurse) by ROYAL Stephen RN (offgoing nurse). Report included the following information SBAR, Kardex, Intake/Output, MAR, Accordion and Med Rec Status.

## 2019-01-04 NOTE — PROGRESS NOTES
[facility] Progress Note Verba Dural 13w4d Patient admitted for hyperemesis gravidarum 13w4d Estimated Date of Delivery: 19 states she does have vomit x1 this am, intermittent nausea. eating some solids. Vitals:   
Patient Vitals for the past 24 hrs: 
 BP Temp Pulse Resp  
19 0800 110/66 98.9 °F (37.2 °C) 91 16  
19 1912 120/81 98.1 °F (36.7 °C) 84 16  
19 1538 112/74  88  Temp (24hrs), Av.5 °F (36.9 °C), Min:98.1 °F (36.7 °C), Max:98.9 °F (37.2 °C) I&O:   No intake/output data recorded.  1901 -  0700 In: 2002.5 [P.O.:460; I.V.:1542.5] Out: 1400 [NWIRI:2241] Exam:  Patient: comfortable without distress Abdomen soft, gravid, NT Fundus NT Lower extremities NT without edema FHTs+ Labs:  
Recent Results (from the past 24 hour(s)) ACETONE/KETONE URINE, QL. Collection Time: 19  7:17 PM  
Result Value Ref Range Acetone/Ketone,urine qual. MODERATE (A) NEG MG/DL  
METABOLIC PANEL, BASIC Collection Time: 19  6:15 AM  
Result Value Ref Range Sodium 137 136 - 145 mmol/L Potassium 3.7 3.5 - 5.1 mmol/L Chloride 105 97 - 108 mmol/L  
 CO2 20 (L) 21 - 32 mmol/L Anion gap 12 5 - 15 mmol/L Glucose 79 65 - 100 mg/dL BUN 4 (L) 6 - 20 MG/DL Creatinine 0.58 0.55 - 1.02 MG/DL  
 BUN/Creatinine ratio 7 (L) 12 - 20 GFR est AA >60 >60 ml/min/1.73m2 GFR est non-AA >60 >60 ml/min/1.73m2 Calcium 8.2 (L) 8.5 - 10.1 MG/DL Overnight, no IVF, no meds for nausea. Vomit after tylenol. Eating some solids Assessment and Plan:   IUP @ 13w4d hyperemesis 1. OB stable, dop tones Qday 2. Recommend scheduled zofran OTD and ADAT. IV clotted- remove now and replace prn. Cont pepcid scheduled. And reglan prn 3. Evaluate for discharge home later today if eating well and stable w/ scheduled zofran 4. Seen with nursing staff, reviewed with patient and family. All ques answered. She understands and agrees.

## 2019-01-05 VITALS
WEIGHT: 110 LBS | SYSTOLIC BLOOD PRESSURE: 106 MMHG | HEIGHT: 62 IN | BODY MASS INDEX: 20.24 KG/M2 | OXYGEN SATURATION: 100 % | TEMPERATURE: 98.1 F | DIASTOLIC BLOOD PRESSURE: 64 MMHG | HEART RATE: 96 BPM | RESPIRATION RATE: 17 BRPM

## 2019-01-05 LAB — KETONES UR QL: >80 MG/DL

## 2019-01-05 PROCEDURE — 74011250636 HC RX REV CODE- 250/636: Performed by: OBSTETRICS & GYNECOLOGY

## 2019-01-05 PROCEDURE — 74011250637 HC RX REV CODE- 250/637: Performed by: OBSTETRICS & GYNECOLOGY

## 2019-01-05 PROCEDURE — 81002 URINALYSIS NONAUTO W/O SCOPE: CPT

## 2019-01-05 RX ORDER — ONDANSETRON 8 MG/1
8 TABLET, ORALLY DISINTEGRATING ORAL EVERY 8 HOURS
Qty: 90 TAB | Refills: 1 | Status: SHIPPED | OUTPATIENT
Start: 2019-01-05 | End: 2019-02-04

## 2019-01-05 RX ORDER — FAMOTIDINE 40 MG/1
40 TABLET, FILM COATED ORAL 2 TIMES DAILY
Qty: 60 TAB | Refills: 0 | Status: SHIPPED | OUTPATIENT
Start: 2019-01-05 | End: 2019-02-04

## 2019-01-05 RX ADMIN — ONDANSETRON 8 MG: 4 TABLET, ORALLY DISINTEGRATING ORAL at 03:40

## 2019-01-05 RX ADMIN — SODIUM CHLORIDE, SODIUM LACTATE, POTASSIUM CHLORIDE, AND CALCIUM CHLORIDE 125 ML/HR: 600; 310; 30; 20 INJECTION, SOLUTION INTRAVENOUS at 03:40

## 2019-01-05 RX ADMIN — FAMOTIDINE 40 MG: 20 TABLET ORAL at 09:18

## 2019-01-05 RX ADMIN — ONDANSETRON 8 MG: 4 TABLET, ORALLY DISINTEGRATING ORAL at 12:33

## 2019-01-05 NOTE — DISCHARGE SUMMARY
Patient ID:  Crys Montejo  [id number]  71 y.o.  1995    Admit Date: 2019    Discharge Date: 2019     Admitting Physician: Zoey Meek MD    Attending Physician: Margarette Woodard DO    Admission Diagnoses: Pregnancy  Nausea and vomiting in pregnancy  Hyperemesis gravidarum    Procedures for this admission: IV fluid hydration, antiemetics, supportive care     Discharge Diagnoses: Same     Discharge Disposition:  Home    Discharge Condition:  Stable    Additional Diagnoses: hyperemesis gravidarum. Maternal Labs: No results found for: HBSAGEXT, HIVEXT, RUBELLAEXT, RPREXT, GRBSEXT    Cord Blood Results: This patient has no babies on file. History of Present Illness:   OB History      Para Term  AB Living    1              SAB TAB Ectopic Molar Multiple Live Births                       Admitted for N&V, dehydration . Hospital Course:   Patient was admitted as above, treated for hyperemesis with IV fluids and IV antiemetics . Please the chart for details. She was transitioned to an oral regimen of ODT Zofran and Pepcid and was able to tolerate po intake. She was deemed stable for discharge home on 19     Follow-up Care:  Follow up with Dr. Sherlyn Quan in 1-2wks     Signed:  Shalini Byrne DO  2019  10:21 AM

## 2019-01-05 NOTE — PROGRESS NOTES
0030 - Patient has now ate part of her chicken and a fourth of a patricia. 0130 - Patient ate rest of patricia. 0400 - Patient drink 118 ml of juice/ 
 
0700 - Patient has had no vomiting for the last 12 hours. 56 - Verbal shift change report given to Gurmeet Marie (oncoming nurse) by Grupo Larose (offgoing nurse). Report included the following information SBAR, Intake/Output and Recent Results.

## 2019-01-05 NOTE — PROGRESS NOTES
1300:  The patient is discharged to home with instructions to followup within the week. The patient and father were given thorough instructions on care at home. The patient is able to verbalize understanding. Prescriptions were given for Pepcid and Zofran. The patient ambulated off of the unit with her dad.

## 2019-01-05 NOTE — DISCHARGE INSTRUCTIONS
Patient Education        Extreme Nausea and Vomiting in Pregnancy: Care Instructions  Your Care Instructions    Nausea and vomiting (often called morning sickness) are common in pregnancy. They are caused by pregnancy hormones and happen most often in the first 3 months. Some women get very sick and are not able to keep down food and fluids. This extreme morning sickness is called hyperemesis gravidarum. It can lead to a dangerous loss of fluids in the body. It also can keep you from gaining weight and getting proper nutrition during your pregnancy. Your body fluids are put back in balance with water and minerals called electrolytes. Medicine may help if you have severe nausea and vomiting. Follow-up care is a key part of your treatment and safety. Be sure to make and go to all appointments, and call your doctor if you are having problems. It's also a good idea to know your test results and keep a list of the medicines you take. How can you care for yourself at home? · Take your medicines exactly as prescribed. Call your doctor if you think you are having a problem with your medicine. · Drink plenty of fluids to prevent dehydration. Choose water and other caffeine-free clear liquids until you feel better. Try sipping on sports drinks that have salt and sugar in them. · Eat a small snack, such as crackers, before you get out of bed. Wait a few minutes, then get out of bed slowly. · Keep food in your stomach, but not too much at once. An empty stomach can make nausea worse. Eat several small meals every day instead of three large meals. · Eat more protein and less fat. · Get plenty of vitamin B6 by eating whole grains, nuts, seeds, and legumes. You can take vitamin B6 tablets if your doctor says it is okay. · Try to avoid smells and foods that make you feel sick to your stomach. · Get lots of rest.  · You may want to try acupressure bands.  They put pressure on an acupressure point in the wrist. Some women feel better using the bands. · Thea may also help you feel better. You can use it in tea, take it as a pill, or use a thea syrup that you can buy at a health food store. When should you call for help? Call 911 anytime you think you may need emergency care. For example, call if:    · You passed out (lost consciousness).    Call your doctor now or seek immediate medical care if:    · You are sick to your stomach or cannot drink fluids.     · You have symptoms of dehydration, such as:  ? Dry eyes and a dry mouth. ? Passing only a little urine. ? Feeling thirstier than usual.     · You are not able to keep down your medicines.     · You have pain in your belly or pelvis.    Watch closely for changes in your health, and be sure to contact your doctor if:    · You do not get better as expected. Where can you learn more? Go to http://geovanna-dev.info/. Enter D369 in the search box to learn more about \"Extreme Nausea and Vomiting in Pregnancy: Care Instructions. \"  Current as of: November 21, 2017  Content Version: 11.8  © 8819-8262 [employer]. Care instructions adapted under license by [employer] (which disclaims liability or warranty for this information). If you have questions about a medical condition or this instruction, always ask your healthcare professional. [place] [address] any warranty or liability for your use of this information. SCHEDULE A FOLLOW UP APPOINTMENT IN ONE WEEK. [employer] Activation    Thank you for requesting access to [employer]. Please follow the instructions below to securely access and download your online medical record. [employer] allows you to send messages to your doctor, view your test results, renew your prescriptions, schedule appointments, and more. How Do I Sign Up? 1. In your internet browser, go to [email or web address]  2. Click on the First Time User? Click Here link in the Sign In box.  You will be redirect to the New Member Sign Up page. 3. Enter your SERPst Access Code exactly as it appears below. You will not need to use this code after youve completed the sign-up process. If you do not sign up before the expiration date, you must request a new code. MyChart Access Code: Activation code not generated  Current [employer] Status: Active (This is the date your [employer] access code will )    4. Enter the last four digits of your Social Security Number (xxxx) and Date of Birth (mm/dd/yyyy) as indicated and click Submit. You will be taken to the next sign-up page. 5. Create a SERPst ID. This will be your [employer] login ID and cannot be changed, so think of one that is secure and easy to remember. 6. Create a SERPst password. You can change your password at any time. 7. Enter your Password Reset Question and Answer. This can be used at a later time if you forget your password. 8. Enter your e-mail address. You will receive e-mail notification when new information is available in [address]. 9. Click Sign Up. You can now view and download portions of your medical record. 10. Click the Download Summary menu link to download a portable copy of your medical information. Additional Information    If you have questions, please visit the Frequently Asked Questions section of the [employer] website at [email or web address] Remember, [employer] is NOT to be used for urgent needs. For medical emergencies, dial 911.

## 2019-01-05 NOTE — PROGRESS NOTES
[facility] Progress Note Dian Blackman 13w5d Patient admitted for hyperemesis gravidarum 13w5d Estimated Date of Delivery: 19 Feeling better this AM, no vomiting since yesterday AM. Tolerating liquids, voiding well, and eating small amounts. Had toast for breakfast and has kept that down. Vitals:   
Patient Vitals for the past 24 hrs: 
 BP Temp Pulse Resp  
19 2237 124/77  88   
19 1918 121/72 97.7 °F (36.5 °C) 86 16  
19 1554 115/65 98.3 °F (36.8 °C) 90 16  
19 1132 134/78 98.6 °F (37 °C) 90 16 Temp (24hrs), Av.2 °F (36.8 °C), Min:97.7 °F (36.5 °C), Max:98.6 °F (37 °C) I&O:   701 -  190 In: -  
Out: 899 [QLINF:476] 1901 -  0700 In: 2965.3 [P.O.:834; I.V.:2131.3] Out: 1625 [GNMXP:[id number]] Exam:  Patient: comfortable without distress Abdomen soft, gravid, NT Fundus NT Lower extremities NT without edema FHTs+ Labs:  
Recent Results (from the past 24 hour(s)) ACETONE/KETONE URINE, QL. Collection Time: 19 12:40 AM  
Result Value Ref Range Acetone/Ketone,urine qual. >80 (A) NEG MG/DL Overnight, no IVF, no meds for nausea. Vomit after tylenol. Eating some solids Assessment and Plan:   IUP @ 13w5d hyperemesis 1. OB stable, dop tones Qday 2. Continue scheduled Zofran and Pepcid 3. Dispo: as long as continues to tolerate PO this AM will discharge home with office follow up next week

## 2019-06-14 LAB — GRBS, EXTERNAL: POSITIVE

## 2019-07-13 ENCOUNTER — ANESTHESIA EVENT (OUTPATIENT)
Dept: LABOR AND DELIVERY | Age: 24
End: 2019-07-13
Payer: COMMERCIAL

## 2019-07-13 ENCOUNTER — HOSPITAL ENCOUNTER (INPATIENT)
Age: 24
LOS: 3 days | Discharge: HOME | End: 2019-07-16
Attending: OBSTETRICS & GYNECOLOGY | Admitting: OBSTETRICS & GYNECOLOGY
Payer: COMMERCIAL

## 2019-07-13 ENCOUNTER — ANESTHESIA (OUTPATIENT)
Dept: LABOR AND DELIVERY | Age: 24
End: 2019-07-13
Payer: COMMERCIAL

## 2019-07-13 DIAGNOSIS — R52 POSTPARTUM PAIN: Primary | ICD-10-CM

## 2019-07-13 PROBLEM — Z34.90 PREGNANCY: Status: ACTIVE | Noted: 2019-07-13

## 2019-07-13 LAB
BASOPHILS # BLD: 0 K/UL (ref 0–0.1)
BASOPHILS NFR BLD: 0 % (ref 0–1)
DIFFERENTIAL METHOD BLD: ABNORMAL
EOSINOPHIL # BLD: 0 K/UL (ref 0–0.4)
EOSINOPHIL NFR BLD: 0 % (ref 0–7)
ERYTHROCYTE [DISTWIDTH] IN BLOOD BY AUTOMATED COUNT: 16.8 % (ref 11.5–14.5)
HCT VFR BLD AUTO: 37.2 % (ref 35–47)
HGB BLD-MCNC: 12.3 G/DL (ref 11.5–16)
IMM GRANULOCYTES # BLD AUTO: 0 K/UL (ref 0–0.04)
IMM GRANULOCYTES NFR BLD AUTO: 1 % (ref 0–0.5)
LYMPHOCYTES # BLD: 2 K/UL (ref 0.8–3.5)
LYMPHOCYTES NFR BLD: 28 % (ref 12–49)
MCH RBC QN AUTO: 28.3 PG (ref 26–34)
MCHC RBC AUTO-ENTMCNC: 33.1 G/DL (ref 30–36.5)
MCV RBC AUTO: 85.7 FL (ref 80–99)
MONOCYTES # BLD: 0.9 K/UL (ref 0–1)
MONOCYTES NFR BLD: 13 % (ref 5–13)
NEUTS SEG # BLD: 4.3 K/UL (ref 1.8–8)
NEUTS SEG NFR BLD: 58 % (ref 32–75)
NRBC # BLD: 0 K/UL (ref 0–0.01)
NRBC BLD-RTO: 0 PER 100 WBC
PLATELET # BLD AUTO: 179 K/UL (ref 150–400)
PMV BLD AUTO: 11.3 FL (ref 8.9–12.9)
RBC # BLD AUTO: 4.34 M/UL (ref 3.8–5.2)
WBC # BLD AUTO: 7.3 K/UL (ref 3.6–11)

## 2019-07-13 PROCEDURE — 36415 COLL VENOUS BLD VENIPUNCTURE: CPT

## 2019-07-13 PROCEDURE — 85025 COMPLETE CBC W/AUTO DIFF WBC: CPT

## 2019-07-13 PROCEDURE — 74011250636 HC RX REV CODE- 250/636

## 2019-07-13 PROCEDURE — 74011000250 HC RX REV CODE- 250

## 2019-07-13 PROCEDURE — 74011000258 HC RX REV CODE- 258: Performed by: OBSTETRICS & GYNECOLOGY

## 2019-07-13 PROCEDURE — 51701 INSERT BLADDER CATHETER: CPT

## 2019-07-13 PROCEDURE — 74011250636 HC RX REV CODE- 250/636: Performed by: ANESTHESIOLOGY

## 2019-07-13 PROCEDURE — 65270000029 HC RM PRIVATE

## 2019-07-13 PROCEDURE — 77030034850

## 2019-07-13 PROCEDURE — 74011250636 HC RX REV CODE- 250/636: Performed by: OBSTETRICS & GYNECOLOGY

## 2019-07-13 PROCEDURE — 75410000002 HC LABOR FEE PER 1 HR: Performed by: OBSTETRICS & GYNECOLOGY

## 2019-07-13 PROCEDURE — 74011000250 HC RX REV CODE- 250: Performed by: ANESTHESIOLOGY

## 2019-07-13 PROCEDURE — 59025 FETAL NON-STRESS TEST: CPT

## 2019-07-13 PROCEDURE — 77010026065 HC OXYGEN MINIMUM MEDICAL AIR: Performed by: OBSTETRICS & GYNECOLOGY

## 2019-07-13 PROCEDURE — 99285 EMERGENCY DEPT VISIT HI MDM: CPT

## 2019-07-13 PROCEDURE — 75410000000 HC DELIVERY VAGINAL/SINGLE: Performed by: OBSTETRICS & GYNECOLOGY

## 2019-07-13 PROCEDURE — 77030011943

## 2019-07-13 PROCEDURE — 76060000078 HC EPIDURAL ANESTHESIA: Performed by: ANESTHESIOLOGY

## 2019-07-13 PROCEDURE — 77030014125 HC TY EPDRL BBMI -B: Performed by: ANESTHESIOLOGY

## 2019-07-13 PROCEDURE — 77010026064 HC OXYGEN INFANT MED AIR MIN: Performed by: OBSTETRICS & GYNECOLOGY

## 2019-07-13 PROCEDURE — 75410000003 HC RECOV DEL/VAG/CSECN EA 0.5 HR: Performed by: OBSTETRICS & GYNECOLOGY

## 2019-07-13 RX ORDER — NALOXONE HYDROCHLORIDE 0.4 MG/ML
0.4 INJECTION, SOLUTION INTRAMUSCULAR; INTRAVENOUS; SUBCUTANEOUS AS NEEDED
Status: DISCONTINUED | OUTPATIENT
Start: 2019-07-13 | End: 2019-07-14

## 2019-07-13 RX ORDER — DEXTROSE MONOHYDRATE AND SODIUM CHLORIDE 5; .9 G/100ML; G/100ML
125 INJECTION, SOLUTION INTRAVENOUS CONTINUOUS
Status: DISCONTINUED | OUTPATIENT
Start: 2019-07-13 | End: 2019-07-14

## 2019-07-13 RX ORDER — LIDOCAINE HYDROCHLORIDE AND EPINEPHRINE 20; 5 MG/ML; UG/ML
INJECTION, SOLUTION EPIDURAL; INFILTRATION; INTRACAUDAL; PERINEURAL AS NEEDED
Status: DISCONTINUED | OUTPATIENT
Start: 2019-07-13 | End: 2019-07-17 | Stop reason: HOSPADM

## 2019-07-13 RX ORDER — SODIUM CHLORIDE, SODIUM LACTATE, POTASSIUM CHLORIDE, CALCIUM CHLORIDE 600; 310; 30; 20 MG/100ML; MG/100ML; MG/100ML; MG/100ML
125 INJECTION, SOLUTION INTRAVENOUS CONTINUOUS
Status: DISCONTINUED | OUTPATIENT
Start: 2019-07-13 | End: 2019-07-14

## 2019-07-13 RX ORDER — HYDROMORPHONE HYDROCHLORIDE 2 MG/ML
1 INJECTION, SOLUTION INTRAMUSCULAR; INTRAVENOUS; SUBCUTANEOUS
Status: DISCONTINUED | OUTPATIENT
Start: 2019-07-13 | End: 2019-07-14

## 2019-07-13 RX ORDER — DEXTROSE MONOHYDRATE AND SODIUM CHLORIDE 5; .45 G/100ML; G/100ML
999 INJECTION, SOLUTION INTRAVENOUS ONCE
Status: COMPLETED | OUTPATIENT
Start: 2019-07-13 | End: 2019-07-13

## 2019-07-13 RX ORDER — SODIUM CHLORIDE 0.9 % (FLUSH) 0.9 %
5-40 SYRINGE (ML) INJECTION AS NEEDED
Status: DISCONTINUED | OUTPATIENT
Start: 2019-07-13 | End: 2019-07-14

## 2019-07-13 RX ORDER — SODIUM CHLORIDE 0.9 % (FLUSH) 0.9 %
5-40 SYRINGE (ML) INJECTION EVERY 8 HOURS
Status: DISCONTINUED | OUTPATIENT
Start: 2019-07-13 | End: 2019-07-14

## 2019-07-13 RX ORDER — OXYTOCIN/0.9 % SODIUM CHLORIDE 30/500 ML
0-20 PLASTIC BAG, INJECTION (ML) INTRAVENOUS
Status: DISCONTINUED | OUTPATIENT
Start: 2019-07-13 | End: 2019-07-14

## 2019-07-13 RX ORDER — EPHEDRINE SULFATE/0.9% NACL/PF 50 MG/5 ML
10 SYRINGE (ML) INTRAVENOUS ONCE
Status: COMPLETED | OUTPATIENT
Start: 2019-07-13 | End: 2019-07-13

## 2019-07-13 RX ORDER — EPHEDRINE SULFATE/0.9% NACL/PF 50 MG/5 ML
5 SYRINGE (ML) INTRAVENOUS ONCE
Status: COMPLETED | OUTPATIENT
Start: 2019-07-13 | End: 2019-07-13

## 2019-07-13 RX ORDER — ONDANSETRON 2 MG/ML
4 INJECTION INTRAMUSCULAR; INTRAVENOUS
Status: DISCONTINUED | OUTPATIENT
Start: 2019-07-13 | End: 2019-07-14

## 2019-07-13 RX ORDER — EPHEDRINE SULFATE/0.9% NACL/PF 50 MG/5 ML
SYRINGE (ML) INTRAVENOUS
Status: COMPLETED
Start: 2019-07-13 | End: 2019-07-13

## 2019-07-13 RX ORDER — FENTANYL/BUPIVACAINE/NS/PF 2-1250MCG
1-16 PREFILLED PUMP RESERVOIR EPIDURAL CONTINUOUS
Status: DISCONTINUED | OUTPATIENT
Start: 2019-07-13 | End: 2019-07-14

## 2019-07-13 RX ORDER — BUPIVACAINE HYDROCHLORIDE 2.5 MG/ML
INJECTION, SOLUTION EPIDURAL; INFILTRATION; INTRACAUDAL AS NEEDED
Status: DISCONTINUED | OUTPATIENT
Start: 2019-07-13 | End: 2019-07-17 | Stop reason: HOSPADM

## 2019-07-13 RX ORDER — OXYTOCIN/0.9 % SODIUM CHLORIDE 30/500 ML
PLASTIC BAG, INJECTION (ML) INTRAVENOUS
Status: COMPLETED
Start: 2019-07-13 | End: 2019-07-13

## 2019-07-13 RX ADMIN — SODIUM CHLORIDE 5 MILLION UNITS: 900 INJECTION, SOLUTION INTRAVENOUS at 06:52

## 2019-07-13 RX ADMIN — SODIUM CHLORIDE, SODIUM LACTATE, POTASSIUM CHLORIDE, AND CALCIUM CHLORIDE 125 ML/HR: 600; 310; 30; 20 INJECTION, SOLUTION INTRAVENOUS at 10:44

## 2019-07-13 RX ADMIN — PENICILLIN G POTASSIUM 2.5 MILLION UNITS: 20000000 POWDER, FOR SOLUTION INTRAVENOUS at 10:51

## 2019-07-13 RX ADMIN — SODIUM CHLORIDE, SODIUM LACTATE, POTASSIUM CHLORIDE, AND CALCIUM CHLORIDE 125 ML/HR: 600; 310; 30; 20 INJECTION, SOLUTION INTRAVENOUS at 08:20

## 2019-07-13 RX ADMIN — Medication 10 ML/HR: at 23:17

## 2019-07-13 RX ADMIN — Medication 10 MG: at 10:31

## 2019-07-13 RX ADMIN — DEXTROSE MONOHYDRATE AND SODIUM CHLORIDE 250 ML: 5; .45 INJECTION, SOLUTION INTRAVENOUS at 06:52

## 2019-07-13 RX ADMIN — Medication 5 MG: at 08:07

## 2019-07-13 RX ADMIN — OXYTOCIN-SODIUM CHLORIDE 0.9% IV SOLN 30 UNIT/500ML 2 MILLI-UNITS/MIN: 30-0.9/5 SOLUTION at 19:15

## 2019-07-13 RX ADMIN — PENICILLIN G POTASSIUM 2.5 MILLION UNITS: 20000000 POWDER, FOR SOLUTION INTRAVENOUS at 19:04

## 2019-07-13 RX ADMIN — BUPIVACAINE HYDROCHLORIDE 4 ML: 2.5 INJECTION, SOLUTION EPIDURAL; INFILTRATION; INTRACAUDAL at 07:51

## 2019-07-13 RX ADMIN — Medication 10 ML/HR: at 08:35

## 2019-07-13 RX ADMIN — PENICILLIN G POTASSIUM 2.5 MILLION UNITS: 20000000 POWDER, FOR SOLUTION INTRAVENOUS at 22:48

## 2019-07-13 RX ADMIN — SODIUM CHLORIDE, SODIUM LACTATE, POTASSIUM CHLORIDE, AND CALCIUM CHLORIDE 1000 ML: 600; 310; 30; 20 INJECTION, SOLUTION INTRAVENOUS at 09:30

## 2019-07-13 RX ADMIN — LIDOCAINE HYDROCHLORIDE AND EPINEPHRINE 3 ML: 20; 5 INJECTION, SOLUTION EPIDURAL; INFILTRATION; INTRACAUDAL; PERINEURAL at 07:51

## 2019-07-13 RX ADMIN — PENICILLIN G POTASSIUM 2.5 MILLION UNITS: 20000000 POWDER, FOR SOLUTION INTRAVENOUS at 15:45

## 2019-07-13 RX ADMIN — SODIUM CHLORIDE, SODIUM LACTATE, POTASSIUM CHLORIDE, AND CALCIUM CHLORIDE 125 ML/HR: 600; 310; 30; 20 INJECTION, SOLUTION INTRAVENOUS at 06:52

## 2019-07-13 RX ADMIN — DEXTROSE MONOHYDRATE AND SODIUM CHLORIDE 999 ML/HR: 5; .9 INJECTION, SOLUTION INTRAVENOUS at 14:59

## 2019-07-13 RX ADMIN — SODIUM CHLORIDE, SODIUM LACTATE, POTASSIUM CHLORIDE, AND CALCIUM CHLORIDE 125 ML/HR: 600; 310; 30; 20 INJECTION, SOLUTION INTRAVENOUS at 16:52

## 2019-07-13 RX ADMIN — Medication 10 MG: at 08:06

## 2019-07-13 RX ADMIN — Medication 10 ML/HR: at 16:57

## 2019-07-13 NOTE — PROGRESS NOTES
0700-Bedside report received from Jo Hogan RN. POC discussed with pt at this time, she verbalized understanding and has no questions or concerns at this time. 0728-Anesthesia called for an epidural at this time, Dr. Noé Odonnell states he will be over to place. 1044-Dr. Le at bedside to see pt, discuss POC, and perform SVE. 1047-Pt repositioned to left lateral with birthing ball placed in between legs. 1136-Pt repositioned to right lateral with birthing ball in between legs. 1224-Pt repositioned to left lateral with birthing ball placed in between legs. 1344-Pt repositioned to right lateral with birthing ball placed in between legs. Pt states she is comfortable and needs nothing else at this time. 1429-Pt repositioned to left lateral with birthing ball placed in between legs. 1444-Pt repositioned to right lateral with birthing ball placed in between legs, 02 on via non-rebreather, and IV bolus infusing. Writer will update pt on minimal variability of pt's strip. 1459-Writer called Dr. Ana Landon MD notified of minimal variability of strip. New order received to give D5NS fluid bolus, infuse 500ml at 999ml/hr. No other orders at this time. 1540-Pt repositioned to left lateral with birthing ball placed in between legs. Pt states she is comfortable and needs nothing else at this time. 1623-Dr. Le called, MD updated that pt got D5NS bolus, that variability became moderate for about twenty minutes but is minimal again now. MD also reported writer gave pt an ice pop to attempt to increase the variability. MD states no decelerations noted, and she is not worried about the strip at this time. 1625-Pt repositioned to right lateral with birthing ball placed in between legs. Pt states she is comfortable and needs nothing else at this time. 1744-Dr. Le at bedside to see pt and recheck cervix. 1746-SVE 2-3/90/-2, MD wants to place IUPC. 1747-IUPC placed by Dr. Ana Landon. No new orders at this time.   Pt states she doesn't want to reposition at this time. 1845-Bedside report given to ROYAL Roach, RN.

## 2019-07-13 NOTE — ANESTHESIA PREPROCEDURE EVALUATION
Relevant Problems   No relevant active problems       Anesthetic History   No history of anesthetic complications            Review of Systems / Medical History  Patient summary reviewed, nursing notes reviewed and pertinent labs reviewed    Pulmonary  Within defined limits                 Neuro/Psych   Within defined limits           Cardiovascular  Within defined limits                     GI/Hepatic/Renal  Within defined limits              Endo/Other  Within defined limits           Other Findings                   Anesthetic Plan    ASA: 2  Anesthesia type: epidural            Anesthetic plan and risks discussed with: Patient

## 2019-07-13 NOTE — H&P
History & Physical    Name: Charleen Moura MRN: [id number]  SSN: [id number]    YOB: 1995  Age: 24 y.o. Sex: female        Subjective:     Estimated Date of Delivery: 19  OB History    Para Term  AB Living   1             SAB TAB Ectopic Molar Multiple Live Births                    # Outcome Date GA Lbr Harmeet/2nd Weight Sex Delivery Anes PTL Lv   1 Current                Ms. Mary Trevino is admitted with pregnancy at 40w5d for contractions. Was reportedly 3cm on arrival and changed to 5cm. At that point she was uncomfortable and received her epidural.  Currently feeling well and not having VB/LOF. Berny Bertha Prenatal course was normal. Please see prenatal records for details. Past Medical History:   Diagnosis Date    Sinusitis     Vitamin D deficiency      History reviewed. No pertinent surgical history. Social History     Occupational History    Not on file   Tobacco Use    Smoking status: Never Smoker    Smokeless tobacco: Never Used   Substance and Sexual Activity    Alcohol use: No    Drug use: No    Sexual activity: Yes     Partners: Male     Birth control/protection: None     History reviewed. No pertinent family history. No Known Allergies  Prior to Admission medications    Medication Sig Start Date End Date Taking? Authorizing Provider   ondansetron (ZOFRAN ODT) 4 mg disintegrating tablet Take 1 Tab by mouth every eight (8) hours as needed for Nausea. 18   Madhavi Cooney NP        Review of Systems: A comprehensive review of systems was negative except for that written in the HPI. Objective:     Vitals:  Vitals:    19 1034 19 1038 19 1054 19 1122   BP: 94/63 111/69 116/49 117/78   Pulse: 81 77 90 (!) 109   Resp:       Temp:       SpO2:   100%    Height:            Physical Exam:  Patient without distress.   Heart: Regular rate and rhythm  Lung: normal respiratory effort  Abdomen: soft, nontender  Fundus: soft and non tender  Perineum: blood absent, amniotic fluid absent  Cervical Exam: 2/50/-3 (floating)  Lower Extremities: WWP   Membranes:  Intact  Fetal Heart Rate: Reactive    Prenatal Labs:   Lab Results   Component Value Date/Time    ABO/Rh(D) A POSITIVE 11/17/2018 08:39 PM    Rubella, External Immune 12/05/2018    GrBStrep, External Positive 06/14/2019    HBsAg, External Negative 12/05/2018    HIV, External Negative 12/05/2018    RPR, External non reactive 12/05/2018    Gonorrhea, External Negative 12/05/2018    Chlamydia, External Negative 12/05/2018    ABO,Rh A Positive 12/05/2018         Assessment/Plan:     Active Problems:  IUP @ 40w5d   Contractions        Plan: Admitted for labor but cervix not actually changed. She is comfortable now. Unable to AROM 2/2 fetal station. Will continue to monitor. Discussed augmentation with pitocin pending change. Is s/p PCN x2 for GBS+ status. Consented  [facility]   IVF/Clears  Epidural.   Continue expectant management for now.         Doretha Torres MD  [facility]

## 2019-07-13 NOTE — PROGRESS NOTES
1845: Bedside and Verbal shift change report given to Uziel Vergara RN (oncoming nurse) by Onesimo Maza RN (offgoing nurse). Report included the following information SBAR, Kardex, Intake/Output, MAR, Accordion, Recent Results and Med Rec Status. 2126Caidan Braun MD at bedside to perform cervical exam.      0710: Bedside and Verbal shift change report given to [address] [place] [address] (oncoming nurse) by Uziel Vergara RN (offgoing nurse). Report included the following information SBAR, Kardex, Intake/Output, MAR, Accordion, Recent Results and Med Rec Status.

## 2019-07-13 NOTE — PROGRESS NOTES
Labor Note    Katerina De La Torre  [id number]  1995   40w5d      S:  Feeling comfortable with epidural    O:    Visit Vitals  BP 96/57   Pulse (!) 109   Temp 98.2 °F (36.8 °C)   Resp 16   Ht 5' 2\" (1.575 m)   LMP 10/01/2018   SpO2 100%   BMI 20.12 kg/m²     Cervical Exam  Dilation (cm): 2(2-3)  Eff: 70 %  Station: -3  Vaginal exam done by? : Dr. Ramos Patches Status: AROM - thick meconium     Patient Vitals for the past 4 hrs: Mode Fetal Heart Rate Variability Decelerations Accelerations RN Reviewed Strip?   19 1145 External 145 6-25 BPM None Yes Yes   19 1130 External 140 6-25 BPM None Yes Yes   19 1115 External 140 6-25 BPM None Yes Yes   19 1100 External 140 6-25 BPM Variable Yes Yes   19 1045 External 140 6-25 BPM None No Yes   19 1030 External 135 6-25 BPM None Yes Yes   19 1015 External 135 6-25 BPM None Yes Yes   19 1000 External 140 (!) Less than or equal to 5 BPM None No Yes   19 0945 External 140 6-25 BPM None Yes Yes   19 0930 External 140 6-25 BPM None Yes Yes   19 0915 External 145 6-25 BPM None Yes Yes       A/P:  24 y.o.  @ 40w5d - latent labor   1. CEFM/[place]  2. GBS + on PCN / Rh+  3. Pitocin not currently indicated. 4.  Pain control - epidural  5. [place] prn.         Max Bennett MD  [facility]

## 2019-07-13 NOTE — PROGRESS NOTES
0545- Pt arrives from home c/o contractions since 8pm last night. Placed on monitor x 2. SVE 2-3/50/-2    0600- Pt given water and pepsi to drink as tracing has minimal variability. Pt repositioned to left side. 2889- IV started and bolus given. 8366-  Dr Jamaal Rubin updated on pt. Orders to give 250ml bolus of D5.    0645- Pt c/o intense rectal pressure. SVE 5/70/-2. Dr Jamaal Rubin updated.

## 2019-07-13 NOTE — ANESTHESIA PROCEDURE NOTES
Epidural Block    Start time: 7/13/2019 7:46 AM  End time: 7/13/2019 7:57 AM  Performed by: Sveta Aguiar MD  Authorized by:  Sveta Aguiar MD     Pre-Procedure  Indication: labor epidural    Preanesthetic Checklist: patient identified, risks and benefits discussed, anesthesia consent, timeout performed and anesthesia consent      Epidural:   Patient position:  Seated  Prep region:  Lumbar  Prep: Betadine    Location:  L3-4    Needle and Epidural Catheter:   Needle Type:  Tuohy  Needle Gauge:  17 G  Injection Technique:  Loss of resistance using saline  Attempts:  1  Catheter Size:  18 G  Events: no paresthesia and negative aspiration test    Test Dose:  Lidocaine 1.5% w/ epi and negative    Assessment:   Catheter Secured:  Tegaderm and tape  Insertion:  Uncomplicated  Patient tolerance:  Patient tolerated the procedure well with no immediate complications

## 2019-07-13 NOTE — PROGRESS NOTES
Labor Note    Cloteal Taco  [id number]  1995   40w5d      S:  Feeling comfortable with contractions    O:    Visit Vitals  BP 96/57   Pulse (!) 109   Temp 98.2 °F (36.8 °C)   Resp 16   Ht 5' 2\" (1.575 m)   LMP 10/01/2018   SpO2 100%   BMI 20.12 kg/m²     Cervical Exam  Dilation (cm): 3  Eff: 90 %  Station: -2  Vaginal exam done by? : Dr. Anita Bond Status: AROM    IUPC placed easily     Patient Vitals for the past 4 hrs: Mode Fetal Heart Rate Variability Decelerations Accelerations RN Reviewed Strip? Provider who reviewed strip? FHR Interventions   19 1730 External 140 6-25 BPM None No Yes     19 1715 External 145 (!) Less than or equal to 5 BPM None No Yes     19 1700 External 145 6-25 BPM None Yes Yes     19 1645 External 140 6-25 BPM None Yes Yes     19 1630 External 140 6-25 BPM None Yes Yes     19 1615 External 140 (!) Less than or equal to 5 BPM None No Yes     19 1600 External 135 6-25 BPM None Yes Yes     19 1545 External 135 6-25 BPM None No Yes     19 1530 External 135 (!) Less than or equal to 5 BPM Early Yes Yes Minimal with periods of moderate variability.    19 1515 External 135 6-25 BPM None No Yes     19 1500 External 135 (!) Less than or equal to 5 BPM None Yes Yes With periods of moderate variability.    19 1450  Liliam Rogers  Provider Notified   19 1445 External 145 (!) Less than or equal to 5 BPM Early No Yes     19 1430 External 140 (!) Less than or equal to 5 BPM None No Yes  IV Fluid Bolus; Lateral Left;Oxygen       A/P:  24 y.o.  @ 40w5d - labor   1. CEFM/IUPC   2. GBS + on PCN / Rh+  3. Pitocin - consider pending adequacy of contractiosn   4. Pain control - epidural   5. [place] when adequate or prn. Expect .       Ja Aguirre MD  [facility]

## 2019-07-14 PROCEDURE — 10907ZC DRAINAGE OF AMNIOTIC FLUID, THERAPEUTIC FROM PRODUCTS OF CONCEPTION, VIA NATURAL OR ARTIFICIAL OPENING: ICD-10-PCS | Performed by: OBSTETRICS & GYNECOLOGY

## 2019-07-14 PROCEDURE — 65270000029 HC RM PRIVATE

## 2019-07-14 PROCEDURE — 75410000002 HC LABOR FEE PER 1 HR: Performed by: OBSTETRICS & GYNECOLOGY

## 2019-07-14 PROCEDURE — 74011250636 HC RX REV CODE- 250/636: Performed by: OBSTETRICS & GYNECOLOGY

## 2019-07-14 PROCEDURE — 74011250637 HC RX REV CODE- 250/637: Performed by: OBSTETRICS & GYNECOLOGY

## 2019-07-14 PROCEDURE — 3E033VJ INTRODUCTION OF OTHER HORMONE INTO PERIPHERAL VEIN, PERCUTANEOUS APPROACH: ICD-10-PCS | Performed by: OBSTETRICS & GYNECOLOGY

## 2019-07-14 PROCEDURE — 77030021125

## 2019-07-14 PROCEDURE — 77030011943

## 2019-07-14 PROCEDURE — 74011000250 HC RX REV CODE- 250: Performed by: ANESTHESIOLOGY

## 2019-07-14 PROCEDURE — 0HQ9XZZ REPAIR PERINEUM SKIN, EXTERNAL APPROACH: ICD-10-PCS | Performed by: OBSTETRICS & GYNECOLOGY

## 2019-07-14 RX ORDER — DIPHENHYDRAMINE HCL 25 MG
25 CAPSULE ORAL
Status: DISCONTINUED | OUTPATIENT
Start: 2019-07-14 | End: 2019-07-16 | Stop reason: HOSPADM

## 2019-07-14 RX ORDER — HYDROMORPHONE HYDROCHLORIDE 2 MG/ML
1 INJECTION, SOLUTION INTRAMUSCULAR; INTRAVENOUS; SUBCUTANEOUS
Status: DISCONTINUED | OUTPATIENT
Start: 2019-07-14 | End: 2019-07-16 | Stop reason: HOSPADM

## 2019-07-14 RX ORDER — OXYCODONE AND ACETAMINOPHEN 5; 325 MG/1; MG/1
1 TABLET ORAL
Status: DISCONTINUED | OUTPATIENT
Start: 2019-07-14 | End: 2019-07-16 | Stop reason: HOSPADM

## 2019-07-14 RX ORDER — SIMETHICONE 80 MG
80 TABLET,CHEWABLE ORAL
Status: DISCONTINUED | OUTPATIENT
Start: 2019-07-14 | End: 2019-07-16 | Stop reason: HOSPADM

## 2019-07-14 RX ORDER — IBUPROFEN 800 MG/1
800 TABLET ORAL EVERY 8 HOURS
Status: DISCONTINUED | OUTPATIENT
Start: 2019-07-14 | End: 2019-07-16 | Stop reason: HOSPADM

## 2019-07-14 RX ORDER — ONDANSETRON 2 MG/ML
4 INJECTION INTRAMUSCULAR; INTRAVENOUS
Status: DISCONTINUED | OUTPATIENT
Start: 2019-07-14 | End: 2019-07-16 | Stop reason: HOSPADM

## 2019-07-14 RX ORDER — HYDROCORTISONE ACETATE PRAMOXINE HCL 2.5; 1 G/100G; G/100G
CREAM TOPICAL AS NEEDED
Status: DISCONTINUED | OUTPATIENT
Start: 2019-07-14 | End: 2019-07-16 | Stop reason: HOSPADM

## 2019-07-14 RX ORDER — NALOXONE HYDROCHLORIDE 0.4 MG/ML
0.4 INJECTION, SOLUTION INTRAMUSCULAR; INTRAVENOUS; SUBCUTANEOUS AS NEEDED
Status: DISCONTINUED | OUTPATIENT
Start: 2019-07-14 | End: 2019-07-16 | Stop reason: HOSPADM

## 2019-07-14 RX ORDER — OXYTOCIN/RINGER'S LACTATE 20/1000 ML
125-500 PLASTIC BAG, INJECTION (ML) INTRAVENOUS ONCE
Status: COMPLETED | OUTPATIENT
Start: 2019-07-14 | End: 2019-07-14

## 2019-07-14 RX ORDER — DOCUSATE SODIUM 100 MG/1
100 CAPSULE, LIQUID FILLED ORAL
Status: DISCONTINUED | OUTPATIENT
Start: 2019-07-14 | End: 2019-07-16 | Stop reason: HOSPADM

## 2019-07-14 RX ORDER — ACETAMINOPHEN 325 MG/1
650 TABLET ORAL
Status: DISCONTINUED | OUTPATIENT
Start: 2019-07-14 | End: 2019-07-16 | Stop reason: HOSPADM

## 2019-07-14 RX ADMIN — PENICILLIN G POTASSIUM 2.5 MILLION UNITS: 20000000 POWDER, FOR SOLUTION INTRAVENOUS at 03:57

## 2019-07-14 RX ADMIN — OXYTOCIN-SODIUM CHLORIDE 0.9% IV SOLN 30 UNIT/500ML 16 MILLI-UNITS/MIN: 30-0.9/5 SOLUTION at 09:57

## 2019-07-14 RX ADMIN — ACETAMINOPHEN 650 MG: 325 TABLET ORAL at 19:54

## 2019-07-14 RX ADMIN — OXYTOCIN-SODIUM CHLORIDE 0.9% IV SOLN 30 UNIT/500ML 500 MILLI-UNITS/MIN: 30-0.9/5 SOLUTION at 13:22

## 2019-07-14 RX ADMIN — Medication 10000 MILLI-UNITS/HR: at 14:02

## 2019-07-14 RX ADMIN — PENICILLIN G POTASSIUM 2.5 MILLION UNITS: 20000000 POWDER, FOR SOLUTION INTRAVENOUS at 11:32

## 2019-07-14 RX ADMIN — PENICILLIN G POTASSIUM 2.5 MILLION UNITS: 20000000 POWDER, FOR SOLUTION INTRAVENOUS at 07:22

## 2019-07-14 RX ADMIN — IBUPROFEN 800 MG: 800 TABLET ORAL at 14:21

## 2019-07-14 RX ADMIN — IBUPROFEN 800 MG: 800 TABLET ORAL at 23:13

## 2019-07-14 RX ADMIN — ACETAMINOPHEN 650 MG: 325 TABLET ORAL at 14:21

## 2019-07-14 RX ADMIN — SODIUM CHLORIDE, SODIUM LACTATE, POTASSIUM CHLORIDE, AND CALCIUM CHLORIDE 125 ML/HR: 600; 310; 30; 20 INJECTION, SOLUTION INTRAVENOUS at 00:52

## 2019-07-14 RX ADMIN — Medication 10 ML/HR: at 05:55

## 2019-07-14 RX ADMIN — SODIUM CHLORIDE, SODIUM LACTATE, POTASSIUM CHLORIDE, AND CALCIUM CHLORIDE 125 ML/HR: 600; 310; 30; 20 INJECTION, SOLUTION INTRAVENOUS at 09:33

## 2019-07-14 NOTE — PROGRESS NOTES
1415  Resumed care of the pt from CATARINA Franco RN. Bedside report done. Pt remains stable. 1424  Medicated with Motrin 800mg po along with tylenol 650mg po for cramping and elevated temp  1445  Pt states motrin helped with the cramping  3:32 PM Baby remains skin to skin with the pt. IV converted to saline lock   1600  Epidural cath removed and intacted. Alis care given. Under wear and pad applied. Pt OOB to the w/c and transferred to MIU room 208  1605  Bedside report given to ELVIA Santiago RN

## 2019-07-14 NOTE — PROGRESS NOTES
Labor Note    Rendell Rising  [id number]  1995   40w6d      S:  Feeling comfortable. IUPC has been intermittently adequate but does not  contractions consistently well. O:    Visit Vitals  /84   Pulse 98   Temp 98.5 °F (36.9 °C)   Resp 16   Ht 5' 2\" (1.575 m)   LMP 10/01/2018   SpO2 100%   BMI 20.12 kg/m²     Cervical Exam  Dilation (cm): 5-6/100/+1  Vaginal exam done by? : Rey BUCHANAN  Membrane Status: AROM    IUPC replaced easily   Pit @ 12     Patient Vitals for the past 4 hrs: Mode Fetal Heart Rate Variability Decelerations Accelerations RN Reviewed Strip? Non Stress Test   19 0159 External 130 6-25 BPM None Yes Yes Reactive       A/P:  24 y.o.  @ 40w6d - labor     Continue augmentation.    Recheck PRN    Negra Park MD  [facility]

## 2019-07-14 NOTE — PROGRESS NOTES
Labor Note    [facility]  [id number]  1995   40w6d      S:  Feeling comfortable    O:    Visit Vitals  /86   Pulse 86   Temp 98.6 °F (37 °C)   Resp 16   Ht 5' 2\" (1.575 m)   LMP 10/01/2018   SpO2 100%   BMI 20.12 kg/m²     Cervical Exam  Dilation (cm): 9  Eff: 100 %  Station: +1  Vaginal exam done by? : Marina Brooks MD  Membrane Status: AROM    Patient Vitals for the past 4 hrs: Mode Fetal Heart Rate Variability Decelerations Accelerations RN Reviewed Strip? FHR Interventions   19 0959 External 140 (!) Less than or equal to 5 BPM Early No Yes    19 0945      Yes    19 0933 External 140 (!) Less than or equal to 5 BPM Early No Yes    19 0918      Yes    19 0859 External 140 (!) Less than or equal to 5 BPM Early No Yes Lateral Left   19 0845      Yes    19 0832 External 145 (!) Less than or equal to 5 BPM (!) Early; Late No Yes    19 0822 Destiny Onur     Lateral Right   19 0815      Yes    19 0800 External 140 (!) Less than or equal to 5 BPM Early No Yes Oxygen   19 0745      Yes    19 0732 External 140 (!) Less than or equal to 5 BPM (!) Late No Yes    19 0615 External 135    Yes        A/P:  24 y.o.  @ 40w6d - labor     Continue augmentation.          Frandy Stanley MD  [facility]

## 2019-07-14 NOTE — DISCHARGE INSTRUCTIONS
Discharge Instructions for Vaginal Delivery    Patient ID:  Mona Freeman  [id number]  24 y.o.  1995    Take Home Medications       Continue taking your prenatal vitamins if you are breastfeeding. Follow-up care is a key part of your treatment and safety. Please schedule and keep appointments. Follow-up with your primary OB in 6 weeks. Activity  Avoid anything in your vagina for 6 weeks (no intercourse, tampons, or douching). You may drive unless you are taking prescription pain medications. Climbing stairs and light lifting are okay. Please avoid excessive exercise, though walking is okay- you'll be tired! Diet  Regular diet as tolerated. Be sure to drink plenty of fluids if you are breastfeeding. Wound care  If you have stitches, continue to rinse with a squirt bottle of warm water each time you void for about 7-10 days. .  Your stitches will gradually dissolve over four to eight weeks. Sitz baths are also helpful to keep the wound clean, encourage healing, and to help with pain associated with the stitches or hemorrhoids. You can use either a sitz bath basin or a bathtub filled with 2-3\" inches of plain warm water. Soak for 10 minutes 3 times a day as tolerated. Pain Management  1. Over the counter medications such as Tylenol and ibuprofen (Motrin or Advil) are ideal.  These may be taken together, alternating doses. You may  take the maximum dose:  Motrin or Advil (generic ibuprofen), either 3 tablets every 6 hours or 4 tablets every 8 hours or Tylenol (acetominophen) 1000mg every 6 hours (equivalent to 2 extra strength Tylenol). 2. You may also have a precrescription for stronger pain medication. Take only as needed and transition to over the counter medication in the next few days. Minimize amounts of the prescription medication, as it can be habit-forming and will worsen or cause constipation.  Most patients will find that within a couple of days, their pain is adequately controlled using only over-the-counter medications. 3. The prescription pain medication is mixed with Tylenol, therefore, you should not take any extra Tylenol or acetaminophen until you have reduced your prescription pain medication. 4. Add heating pad or sitz baths as needed. Add hemorrhoid wipes or ointments if needed    Constipation  1. Constipation is normal after pregnancy and delivery, especially while taking prescription narcotic pain medication. 2. Over the counter remedies including ducosate (Colace), take 1-2 capsules 1-2 times daily for soft stool as needed. You may also add/ try milk of magnesia or rectal remedies such as Dulcolax or Fleets enema. Recovery: What to Expect at Home  1. Fatigue is expected. Try to rest when you can and don't worry about doing housework or other tasks which can wait. 2. The soreness along your bottom will improve significantly over the first 2 weeks, but it may take 6 weeks before you are completely recovered. 3. Back pain or general body aches or muscle soreness are expected and should improve with acetominophen or ibuprofen. 4. Leg swelling due to pregnancy and/or IV fluids given in the hospital will take about two weeks to resolve. 5. Most women experience some form of the \"Baby Blues\" after having a baby. Feeling emotional, tearful, frustrated, anxious, sad, and irritable some of the time is normal and go away after about 2 weeks. Adequate rest and help from your family will help. Take breaks from caring for the baby. Call your doctor if your symptoms seem severe, last more than 2 weeks, or seem to be getting worse instead of better. Get help immediately if you have thoughts of wanting to hurt yourself or others! Call your doctor or seek immediate medical care if you have:  Heavy vaginal bleeding, soaking through one or more pads an hour for several hours. Foul-smelling discharge from your vagina or incision.   Consistent nausea and vomiting and cannot keep fluids down. Consistent pain that does not get better after you take pain medicine.   Sudden chest pain and shortness of breath  Signs of a blood clot: pain/ swelling/ increasing redness in your lower extremeties  Signs of infection: increased pain in your abdomen or vaginal area; red streaks, warmth, or tenderness of your breasts; fever of 100.5 F or greater

## 2019-07-14 NOTE — PROGRESS NOTES
1822 sbar report received from anya gregory rn  [id number] am assessment performed  1034 increase in maternal heart rate noted, manual pulse taken of 130, pt states she normally has an increase in heart rate when she is anxious and reports feeling anxious now. Pt had eaten ice, will evaluate her temp. Dr law aware  [id number] dr law called, updated on pt's status  1155 dr law in, sve performed, pt began pushing with contractions  1240 iupc removed  1310 dr law called for delivery  1311 dr law in, bed broken down  1406 qbl at this time 230 ml  [phone] sbar report given to [facility] [employer], dr law aware of temp 100.9 and current qbl of 230 ml with second fundal massage boggy with increased firmness with massage, st cath performed with 100 ml of urine.  orders received to give motrin and tylenol

## 2019-07-14 NOTE — PROGRESS NOTES
Labor Note    Ángel Delgado  [id number]  1995   40w6d      S:  Feeling pressure with contractions     O:    Visit Vitals  /86   Pulse 98   Temp 97.6 °F (36.4 °C)   Resp 16   Ht 5' 2\" (1.575 m)   LMP 10/01/2018   SpO2 100%   BMI 20.12 kg/m²     Cervical Exam  Dilation (cm): 4  Eff: 100 %  Station: -1  Vaginal exam done by? : Rey BUCHANAN  Membrane Status: AROM    Patient Vitals for the past 4 hrs: Mode Fetal Heart Rate Fetal Activity Variability Decelerations Accelerations RN Reviewed Strip? Provider who reviewed strip? FHR Interventions   19 2326         Oxygen   19 2230 External 135 Present 6-25 BPM Early No Yes     19 2215       Yes     19 2210         Oxygen   19 2200 External 140 Present 6-25 BPM Early No Yes     19 2145       Yes     19 2130 External 135 Present 6-25 BPM None Yes Yes Rey BUCHANAN     19 2115       Yes     19 2100 External 135 Present 6-25 BPM Variable No Yes     19 2045       Yes     19 2030 External 135 Present 6-25 BPM None Yes  ye    19       Yes         A/P:  24 y.o.  @ 40w6d - labor   1. CEFM/IUPC  2. GBS + on PCN / Rh+  3. Pitocin per protocol. At 10, adequate   4. Pain control - epidural   5. [place] prn.         Charissa Sanders MD  [facility]

## 2019-07-14 NOTE — L&D DELIVERY NOTE
Delivery Summary    Patient: Charlene Braun MRN: [id number]  SSN: [id number]    YOB: 1995  Age: 24 y.o. Sex: female       Information for the patient's :  Goyo Campos, Female Jihan Black [[id number]]       Labor Events:    Labor: No    Steroids:     Cervical Ripening Date/Time:       Cervical Ripening Type: None   Antibiotics During Labor: Yes   Rupture Identifier:      Rupture Date/Time: 2019 1:03 PM   Rupture Type: AROM   Amniotic Fluid Volume: Moderate    Amniotic Fluid Description: Meconium    Amniotic Fluid Odor:      Induction: None       Induction Date/Time:        Indications for Induction:      Augmentation: Oxytocin   Augmentation Date/Time: 287:02 PM   Indications for Augmentation: Ineffective Contraction Pattern   Labor complications: None       Additional complications:        Delivery Events:  Indications For Episiotomy:     Episiotomy: None   Perineal Laceration(s): 1st   Repaired: Yes   Periurethral Laceration Location:      Repaired:     Labial Laceration Location:     Repaired:     Sulcal Laceration Location:     Repaired:     Vaginal Laceration Location:     Repaired:     Cervical Laceration Location:     Repaired:     Repair Suture: Chromic 2-0   Number of Repair Packets: 1   Estimated Blood Loss (ml):  ml     Delivery Date: 2019    Delivery Time: 1:20 PM  Delivery Type: Vaginal, Spontaneous  Sex:  Female    Gestational Age: 38w9d   Delivery Clinician:  Donald Tan  Living Status: Living   Delivery Location: L&D            APGARS  One minute Five minutes Ten minutes   Skin color: 1   1   1     Heart rate: 1   2   2     Grimace: 1   1   2     Muscle tone: 1   1   1     Breathin   1   2     Totals: 5   6   8         Presentation: Vertex    Position: Left Occiput Anterior  Resuscitation Method:  Suctioning-bulb; Tactile Stimulation;PPV;Oxygen;Suctioning-deep;C-PAP     Meconium Stained:  Thick      Cord Information: 3 Vessels  Complications: None  Cord around: shoulders  Delayed cord clamping? No  Cord clamped date/time:2019  1:20 PM  Disposition of Cord Blood: Discard    Blood Gases Sent?: No    Placenta:  Date/Time: 2019  1:22 PM  Removal: Spontaneous      Appearance: Normal      Measurements:  Birth Weight:        Birth Length:        Head Circumference:        Chest Circumference:       Abdominal Girth: Other Providers:   Christine SANTOS;GROVER LAU;Jennifer DEL TORO EMILY;SUSANNAH AN;;;;;;Aston LIRA, Obstetrician;Primary Nurse;Primary [place] Nurse;Nicu Nurse;Neonatologist;Anesthesiologist;Crna;Nurse Practitioner;Midwife;Nursery Nurse;Respiratory Therapist           Group B Strep:   Lab Results   Component Value Date/Time    GrBStrep, External Positive 2019       Delivery Note:     Patient reached FD and pushed with good effort to deliver the fetal head in CHAPITO position. One shoulder cord found and delivered through. The anterior shoulder, followed by the posterior shoulder and the rest of the body then delivered easily. This was a VFI with Apgars of 5, 6 and 8 at 1, 5,  and 10 minutes respectively, weight pending. The infant was placed on mom's abdomen. The cord was then double clamped and cut by the FOB and immediately given to the waiting NICU team.  Cord blood and segment were taken. Cord gas pending. The placenta followed spontaneously, intact, with 3VC. Pitocin was added to the IVF and the fundus was firm to palpation. The vagina, cervix and perineum were examined and a small vaginal skid lorna laceration was found and repaired with a single stitch of 2-0 chromic.  mL. No complications. Mom and baby doing well. Dr. Marcelino Townsend delivering.      Mario East MD  [facility]

## 2019-07-14 NOTE — DISCHARGE SUMMARY
Obstetrical Discharge Summary     Name: Jaida Sarabia MRN: [id number]  SSN: [id number]    YOB: 1995  Age: 24 y.o. Sex: female      Admit Date: 2019    Discharge Date: 19     Attending Physician:  Maximino Araujo MD     Delivering Physician:  Linda Holt MD     * Admission Diagnoses:   IUP @ 40w6d   Latent labro       * Discharge Diagnoses:   Delivery of a VFI via  by Vivek Jones MD on 2019. Apgars were 5, 6,  and 8. Thick meconium  Vaginal skid lorna      Additional Diagnoses:   Hospital Problems as of 2019 Never Reviewed          Codes Class Noted - Resolved POA    Pregnancy ICD-10-CM: Z34.90  ICD-9-CM: V22.2  2019 - Present Unknown             Lab Results   Component Value Date/Time    Rubella, External Immune 2018    GrBStrep, External Positive 2019    There is no immunization history for the selected administration types on file for this patient. * Procedures:   Augmentation of labor        [place]  Depression Scale  I have been able to laugh and see the funny side of things: As much as I always could  I have looked forward with enjoyment to things: As much as I ever did  I have blamed myself unnecessarily when things went wrong: No, never  I have been anxious or worried for no good reason: No, not at all  I have felt scared or panicky for no very good reason: No, not at all  Things have been getting on top of me: No, most of the time I have coped quite well  I have been so unhappy that I have had difficulty sleeping: No, not at all  I have felt sad or miserable: No, not at all  I have been so unhappy that I have been crying: No, never  The thought of harming myself has occurred to me: Never  Total Score: 1    * Discharge Condition: good    * Hospital Course: Normal hospital course following the delivery.     * Disposition: Home    Discharge Medications:   Current Discharge Medication List      START taking these medications    Details   ibuprofen (MOTRIN) 600 mg tablet Take 1 Tab by mouth every six (6) hours as needed for Pain. Qty: 30 Tab, Refills: 1      oxyCODONE-acetaminophen (PERCOCET) 5-325 mg per tablet Take 1 Tab by mouth every six (6) hours as needed for Pain for up to 2 days. Max Daily Amount: 4 Tabs. Qty: 6 Tab, Refills: 0    Associated Diagnoses: Postpartum pain             * Follow-up Care/Patient Instructions:   Activity: Activity as tolerated  Diet: Regular Diet  Wound Care: As directed      Followup 6 weeks for PP check        Signed By:  Jessica Gorman MD     July 16, 2019

## 2019-07-14 NOTE — PROGRESS NOTES
Labor Note    Luis Mandel  [id number]  1995   40w5d      S:  Feeling pressure with contractions     O:    Visit Vitals  /73   Pulse 96   Temp 98.5 °F (36.9 °C)   Resp 16   Ht 5' 2\" (1.575 m)   LMP 10/01/2018   SpO2 100%   BMI 20.12 kg/m²     Cervical Exam  Dilation (cm): 3  Eff: 100 %  Station: -2  Vaginal exam done by? : Dr. Manuela Burt Status: AROM    Patient Vitals for the past 4 hrs: Mode Fetal Heart Rate Fetal Activity Variability Decelerations Accelerations RN Reviewed Strip? Provider who reviewed strip?   19 External 135 Present 6-25 BPM Variable No Yes    19       Yes    19 External 135 Present 6-25 BPM None Yes  ye   19       Yes    19 External 135 Present (!) Less than or equal to 5 BPM None No Yes    19 194       Yes    19 1930 External 135 Present 6-25 BPM Early Yes Yes    19 1915       Yes    19 1900 External 140 Present 6-25 BPM None No Yes    19 1845 External 140  6-25 BPM None Yes Yes    19 1830 External 140  6-25 BPM None Yes Yes    19 1815 External 140  6-25 BPM None Yes Yes    19 1800 External 145  6-25 BPM None Yes Yes    19 1745 External 140  6-25 BPM None Yes Yes      Pit @ 8     A/P:  24 y.o.  @ 40w5d - labor   1. CEFM/IUPC  2. GBS + on PCN / Rh+  3. Pitocin per protocol. 4.  Pain control - epidural   5. [place] prn.       Radha Carballo MD  [facility]

## 2019-07-14 NOTE — PROGRESS NOTES
Labor Note    Mary Greenberg  [id number]  1995   40w6d      S:  Feeling pressure with contractions    O:    Visit Vitals  /86   Pulse (!) 130 Comment: manually taken   Temp 98.6 °F (37 °C)   Resp 16   Ht 5' 2\" (1.575 m)   LMP 10/01/2018   SpO2 100%   BMI 20.12 kg/m²     Cervical Exam  Dilation (cm): 10  Eff: 100 %  Station: +1  Vaginal exam done by? : dr law  Membrane Status: AROM  Dilation Complete Date: 19  Dilation Time Complete: 8028    Patient Vitals for the past 4 hrs: Mode Fetal Heart Rate Variability Decelerations Accelerations RN Reviewed Strip? Provider who reviewed strip? FHR Interventions   19 1210      Yes     19 1204 External 150 6-25 BPM Early No Yes     19 1145      Yes     19 1133 External 150 (!) Less than or equal to 5 BPM Early Yes Yes     19 1115      Yes     19 1106 External 145 (!) Less than or equal to 5 BPM Early No Yes     19 1042      Yes     19 1033 External 150 (!) Less than or equal to 5 BPM Early No Yes     19 1015      Yes dr law    19 1009       dr law    19 0959 External 140 (!) Less than or equal to 5 BPM Early No Yes     19 0945      Yes     19 0933 External 140 (!) Less than or equal to 5 BPM Early No Yes     19 0918      Yes     19 0859 External 140 (!) Less than or equal to 5 BPM Early No Yes  Lateral Left   19 0845      Yes     19 0832 External 145 (!) Less than or equal to 5 BPM (!) Early; Late No Yes     19 0822        Lateral Right       A/P:  24 y.o.  @ 40w6d    Start pushing      Venkatesh Harmon MD  [facility]

## 2019-07-15 PROCEDURE — 65270000029 HC RM PRIVATE

## 2019-07-15 PROCEDURE — 74011250637 HC RX REV CODE- 250/637: Performed by: OBSTETRICS & GYNECOLOGY

## 2019-07-15 PROCEDURE — 77030018846 HC SOL IRR STRL H20 ICUM -A

## 2019-07-15 RX ADMIN — ACETAMINOPHEN 650 MG: 325 TABLET ORAL at 16:56

## 2019-07-15 RX ADMIN — IBUPROFEN 800 MG: 800 TABLET ORAL at 23:06

## 2019-07-15 RX ADMIN — IBUPROFEN 800 MG: 800 TABLET ORAL at 15:05

## 2019-07-15 RX ADMIN — ACETAMINOPHEN 650 MG: 325 TABLET ORAL at 09:47

## 2019-07-15 RX ADMIN — ACETAMINOPHEN 650 MG: 325 TABLET ORAL at 20:57

## 2019-07-15 RX ADMIN — MUPIROCIN: 20 OINTMENT TOPICAL at 16:57

## 2019-07-15 RX ADMIN — MUPIROCIN: 20 OINTMENT TOPICAL at 22:00

## 2019-07-15 RX ADMIN — IBUPROFEN 800 MG: 800 TABLET ORAL at 07:34

## 2019-07-15 NOTE — PROGRESS NOTES
PostPartum Note    Tomeka Miguel  [id number]  1995  24 y.o.    S:  Ms. Tomeka Miguel is a 24 y.o.  PPD #1 s/p TSVD @ 40w6d. Doing well. She had a baby girl. Her lochia is like a period. She describes her pain as mild and is well controlled with PO medications. She is breast feeding and this is going well. She is ambulating and voiding. Tolerating PO intake. O:   Visit Vitals  /74 (BP 1 Location: Left arm, BP Patient Position: At rest)   Pulse 86   Temp 98.9 °F (37.2 °C)   Resp 16   Ht 5' 2\" (1.575 m)   LMP 10/01/2018   SpO2 100%   Breastfeeding? Unknown   BMI 20.12 kg/m²       Lab Results   Component Value Date/Time    WBC 7.3 2019 06:41 AM    HGB 12.3 2019 06:41 AM    HCT 37.2 2019 06:41 AM    PLATELET 777  06:41 AM    MCV 85.7 2019 06:41 AM       Gen - No acute distress  Abdomen - Fundus firm, below the umbilicus   Ext - Warm, well perfused. Nontender    A/P:  PPD #1 s/p TSVD @ 40w6d doing well. 1.  Routine PP instructions/ care discussed  2. Blood type - Rh pos  3. Rubella imm  4. Circumcision n/a   5. Discharge home tomorrow   6. F/U 4-6 weeks for PP check.       Sukumar Wilkinson MD  [facility]

## 2019-07-15 NOTE — PROGRESS NOTES
Bedside and Verbal shift change report given to [employer] (oncoming nurse) by HALIMA Ayala (offgoing nurse). Report given with SBAR, Kardex, Intake/Output and MAR.

## 2019-07-15 NOTE — ROUTINE PROCESS
Bedside and Verbal shift change report given to HALIMA Talamantes RN (oncoming nurse) by Carilyn Siemens. Tobias Villafana RN and ELOISA Haley RN (offgoing nurse). Report included the following information SBAR, Kardex and MAR.

## 2019-07-15 NOTE — LACTATION NOTE
This note was copied from a baby's chart. 0940 - Pt eating breakfast,infant at bedside. Mother asks that [facility] return post breakfast.    [phone] - Visiting with mother post feed. Mother states breastfeeding is going well and record notes multiple feeds with adequate wet and dirty diapers. This is mother's first baby. Mother has only been feeding baby on right breast as she is worried about her inverted nipple on right side. Pt assured this is normal and education performed regarding inverted nipples and nursing. Pt enouraged to nurse on both sides. Techniques to deny nipple discussed including pumping or manual stimulation, while latch assist and nipple shield brought to bedside. Pros and cons of nipple shield use reviewed. Patient instructed how to apply shield to nipple/areola and cleaning of nipple shield. Nipple shield plan of care includes breastfeeding with nipple shield per instructions. Reinforces with pt that nipple shield is best used as temporary tool/aid to help infant learn how to latch onto breast. Massage and manual expression of drops taught and encouraged. No latch observed as infant just fed. Discussed with mother her plan for feeding. Reviewed the benefits of exclusive breast milk feeding during the hospital stay. Informed her of the risks of using formula to supplement in the first few days of life as well as the benefits of successful breast milk feeding. She acknowledges understanding of information reviewed and states that it is her plan to both breast and bottlefeed her infant. Pt chooses to do both breast and bottle. Discussed effects of early supplementation on breastfeeding success; may decrease breastmilk production and supply, increase risk for pathological engorgement, baby may develop preference for faster flow from bottles vs breast, and baby's stomach can be stretched if larger volumes of formula are given.     Reviewed breastfeeding techniques and positions with mother until found a position she was most comfortable with. Reminded mother of early feeding cues and that breast fed infants should be fed on demand without time restriction on the first breast until the infant seems satisfied. Then the second breast is offered. Advised mother to awaken  to feed if three hours have passed since baby last ate. Will continue to monitor mother's progress with breastfeeding and offer assistance at any time. Will support her choice and offer additional information as needed. Pt will successfully establish breastfeeding by feeding in response to early feeding cues   or wake every 3h, will obtain deep latch, and will keep log of feedings/output. Taught to BF at hunger cues and or q 2-3 hrs and to offer 10-20 drops of hand expressed colostrum at any non-feeds. Breast Assessment  Left Breast: Small   Left Nipple:  Inverted  Right Breast: Small   Right Nipple: Everted, Intact  Breast- Feeding Assessment  Attends Breast-Feeding Classes: No  Breast-Feeding Experience: No  Breast Trauma/Surgery: No  Type/Quality: Good        LATCH Documentation  Latch: Repeated attempts, hold nipple in mouth, stimulate to suck  Audible Swallowing: A few with stimulation  Type of Nipple: Everted (after stimulation)(right breast only per mother)  Comfort (Breast/Nipple): Soft/non-tender  Hold (Positioning): No assist from staff, mother able to position/hold infant  LATCH Score: 8 (per mother; not observed)

## 2019-07-16 VITALS
OXYGEN SATURATION: 100 % | RESPIRATION RATE: 17 BRPM | BODY MASS INDEX: 20.12 KG/M2 | SYSTOLIC BLOOD PRESSURE: 126 MMHG | DIASTOLIC BLOOD PRESSURE: 81 MMHG | HEART RATE: 76 BPM | HEIGHT: 62 IN | TEMPERATURE: 98.1 F

## 2019-07-16 PROCEDURE — 74011250637 HC RX REV CODE- 250/637: Performed by: OBSTETRICS & GYNECOLOGY

## 2019-07-16 RX ORDER — OXYCODONE AND ACETAMINOPHEN 5; 325 MG/1; MG/1
1 TABLET ORAL
Qty: 6 TAB | Refills: 0 | Status: SHIPPED | OUTPATIENT
Start: 2019-07-16 | End: 2019-07-18

## 2019-07-16 RX ORDER — IBUPROFEN 600 MG/1
600 TABLET ORAL
Qty: 30 TAB | Refills: 1 | Status: SHIPPED | OUTPATIENT
Start: 2019-07-16

## 2019-07-16 RX ADMIN — SIMETHICONE CHEW TAB 80 MG 80 MG: 80 TABLET ORAL at 02:31

## 2019-07-16 RX ADMIN — SIMETHICONE CHEW TAB 80 MG 80 MG: 80 TABLET ORAL at 11:05

## 2019-07-16 RX ADMIN — ACETAMINOPHEN 650 MG: 325 TABLET ORAL at 11:05

## 2019-07-16 RX ADMIN — ACETAMINOPHEN 650 MG: 325 TABLET ORAL at 02:31

## 2019-07-16 RX ADMIN — IBUPROFEN 800 MG: 800 TABLET ORAL at 06:35

## 2019-07-16 RX ADMIN — ACETAMINOPHEN 650 MG: 325 TABLET ORAL at 06:35

## 2019-07-16 RX ADMIN — MUPIROCIN: 20 OINTMENT TOPICAL at 11:07

## 2019-07-16 NOTE — PROGRESS NOTES
Post-Partum Day Number 2 Progress Note    Patient doing well post-partum without significant complaints. Voiding without difficulty, normal lochia. Vitals:    Patient Vitals for the past 24 hrs:   BP Temp Pulse Resp SpO2   19 0738 122/77 99.1 °F (37.3 °C) 68 16    19 0232 (!) 154/94 98.6 °F (37 °C) 67 18 100 %   07/15/19 2035 (!) 137/91 98.3 °F (36.8 °C) 75 16    07/15/19 1329 130/81 98.6 °F (37 °C) 70 16      Temp (24hrs), Av.7 °F (37.1 °C), Min:98.3 °F (36.8 °C), Max:99.1 °F (37.3 °C)      Vital signs stable, afebrile. Exam:     Feeding: breast    Patient without distress. Abdomen soft, fundus firm, nontender               Lower extremities- nontender without edema; no cords    Labs: No results found for this or any previous visit (from the past 24 hour(s)). Assessment and Plan:  Patient appears to be having uncomplicated post-partum course. Discharge today-instructions reviewed.   A Positive Rx motrin and narcotic

## 2019-07-16 NOTE — ROUTINE PROCESS
0229: Patient called out c/o chest pain 0231:pain 5/10 - VS stable BP mild elevated , Lungs clear, spo2 100% RA Patient given tylenol and simethicone. Will continue to monitor  
0300: Pt sleeping 
0320: Pt states pain is getting a little better but still rates it at 5/10.   
0700: Bedside and Verbal shift change report given to ROYAL Bond (oncoming nurse) by Delgado Murry RN (offgoing nurse). Report included the following information SBAR, Intake/Output, MAR and Recent Results.

## 2019-07-16 NOTE — PROGRESS NOTES
Discharge instructions reviewed and signed. Patient acknowledges understanding. Prescriptions given and paperwork signed. OB follow-up in 6 weeks.

## 2019-07-16 NOTE — LACTATION NOTE
This note was copied from a baby's chart. Mother and baby are for discharge today. Mother states her milk is in and baby is breastfeeding well. Infant weight loss is -4.2%. Baby has pediatric appointment tomorrow. Reviewed breastfeeding basics:  Supply and demand,  stomach size, early  Feeding cues, skin to skin, positioning and baby led latch-on, assymetrical latch with signs of good, deep latch vs shallow, feeding frequency and duration, and log sheet for tracking infant feedings and output. Breastfeeding Booklet and Warm line information given. Discussed typical  weight loss and the importance of infant weight checks with pediatrician 1-2 post discharge. Engorgement Care Guidelines:  Reviewed how milk is made and normal phases of milk production. Taught care of engorged breasts - frequent breastfeeding encouraged, cool packs and motrin as tolerated. Anticipatory guidance shared. Care for sore/tender nipples discussed:  ways to improve positioning and latch practiced and discussed, hand express colostrum after feedings and let air dry, light application of lanolin, hydrogel pads, seek comfortable laid back feeding position, start feedings on least sore side first.    Discussed eating a healthy diet. Instructed mother to eat a variety of foods in order to get a well balanced diet. She should consume an extra 500 calories per day (more than her non-pregnant requirement.) These extra calories will help provide energy needed for optimal breast milk production. Mother also encouraged to \"drink to thirst\" and it is recommended that she drink fluids such as water, fruit/vegetable juice. Nutritious snacks should be available so that she can eat throughout the day to help satisfy her hunger and maintain a good milk supply.     Pt will successfully establish breastfeeding by feeding in response to early feeding cues   or wake every 3h, will obtain deep latch, and will keep log of feedings/output. Taught to BF at hunger cues and or q 2-3 hrs and to offer 10-20 drops of hand expressed colostrum at any non-feeds. Breast Assessment  Left Breast: Medium  Left Nipple: Everted, Intact  Right Breast: Medium  Right Nipple: Everted, Intact  Breast- Feeding Assessment  Attends Breast-Feeding Classes: No  Breast-Feeding Experience: No  Breast Trauma/Surgery: No  Type/Quality: Good(Per mother)  Lactation Consultant Visits  Breast-Feedings: (Mother states she breast fed baby at 0700 for 15 minutes and again at [address]  for 6 minutes. Her milk came in today. mom and baby are for D/C. Instructed mom to call [facility] if she breastfeeds again before D/C)     Mother given LC#/support group info.

## 2020-06-06 NOTE — LETTER
[address] 
[facility] EMERGENCY DEPT 
[address] [facility] [address] [phone] Work/School Note Date: 12/1/2018 To Whom It May concern: 
 
Adian Boone was seen and treated today in the emergency room by the following provider(s): 
Attending Provider: Stella Izaguirre MD 
Nurse Practitioner: Ghazala Brady NP. Aidan Boone may return to school on 12/4/2018. Sincerely, Savannah Ling NP 
 
 
 
 [Negative] : Genitourinary

## 2022-03-19 PROBLEM — Z34.90 PREGNANCY: Status: ACTIVE | Noted: 2019-07-13

## 2022-03-19 PROBLEM — O21.0 HYPEREMESIS GRAVIDARUM: Status: ACTIVE | Noted: 2019-01-04

## 2022-03-19 PROBLEM — O21.9 NAUSEA AND VOMITING IN PREGNANCY: Status: ACTIVE | Noted: 2019-01-02

## 2023-06-21 ENCOUNTER — NURSE ONLY (OUTPATIENT)
Age: 28
End: 2023-06-21

## 2023-06-21 DIAGNOSIS — N92.6 IRREGULAR BLEEDING: Primary | ICD-10-CM

## 2023-06-22 LAB — PROLACTIN SERPL-MCNC: 15.9 NG/ML (ref 4.8–23.3)

## 2023-09-01 ENCOUNTER — TELEPHONE (OUTPATIENT)
Age: 28
End: 2023-09-01

## 2023-09-01 NOTE — TELEPHONE ENCOUNTER
Two patient identifiers used    28year old patient last seen on 6/14/2023 for ae    Patient reports she got a positive upt test this am with lmp of 7/26/2023( 5w2d pregnant)    Patient has first of and ultrasound on 9/26/2023      Patient advised of probable ovulation and implantation time frame  Patient verbalized understanding

## 2023-11-16 ENCOUNTER — APPOINTMENT (OUTPATIENT)
Facility: HOSPITAL | Age: 28
End: 2023-11-16
Payer: MEDICAID

## 2023-11-16 ENCOUNTER — HOSPITAL ENCOUNTER (EMERGENCY)
Facility: HOSPITAL | Age: 28
Discharge: HOME | End: 2023-11-16
Attending: EMERGENCY MEDICINE
Payer: MEDICAID

## 2023-11-16 VITALS
RESPIRATION RATE: 18 BRPM | BODY MASS INDEX: 25.76 KG/M2 | WEIGHT: 140 LBS | HEIGHT: 62 IN | TEMPERATURE: 98.6 F | SYSTOLIC BLOOD PRESSURE: 137 MMHG | DIASTOLIC BLOOD PRESSURE: 92 MMHG | OXYGEN SATURATION: 100 % | HEART RATE: 86 BPM

## 2023-11-16 DIAGNOSIS — I10 HYPERTENSION, UNSPECIFIED TYPE: Primary | ICD-10-CM

## 2023-11-16 LAB
ALBUMIN SERPL-MCNC: 3.4 G/DL (ref 3.5–5)
ALBUMIN/GLOB SERPL: 0.7 (ref 1.1–2.2)
ALP SERPL-CCNC: 41 U/L (ref 45–117)
ALT SERPL-CCNC: 29 U/L (ref 12–78)
ANION GAP SERPL CALC-SCNC: 3 MMOL/L (ref 5–15)
APPEARANCE UR: CLEAR
AST SERPL-CCNC: ABNORMAL U/L (ref 15–37)
BACTERIA URNS QL MICRO: NEGATIVE /HPF
BASOPHILS # BLD: 0 K/UL (ref 0–0.1)
BASOPHILS NFR BLD: 1 % (ref 0–1)
BILIRUB SERPL-MCNC: 0.5 MG/DL (ref 0.2–1)
BILIRUB UR QL: NEGATIVE
BUN SERPL-MCNC: 10 MG/DL (ref 6–20)
BUN/CREAT SERPL: 12 (ref 12–20)
CALCIUM SERPL-MCNC: 8.6 MG/DL (ref 8.5–10.1)
CHLORIDE SERPL-SCNC: 110 MMOL/L (ref 97–108)
CO2 SERPL-SCNC: 22 MMOL/L (ref 21–32)
COLOR UR: ABNORMAL
CREAT SERPL-MCNC: 0.83 MG/DL (ref 0.55–1.02)
D DIMER PPP FEU-MCNC: 2.45 MG/L FEU (ref 0–0.65)
DIFFERENTIAL METHOD BLD: ABNORMAL
EOSINOPHIL # BLD: 0 K/UL (ref 0–0.4)
EOSINOPHIL NFR BLD: 0 % (ref 0–7)
EPITH CASTS URNS QL MICRO: ABNORMAL /LPF
ERYTHROCYTE [DISTWIDTH] IN BLOOD BY AUTOMATED COUNT: 14.3 % (ref 11.5–14.5)
GLOBULIN SER CALC-MCNC: 4.9 G/DL (ref 2–4)
GLUCOSE SERPL-MCNC: 104 MG/DL (ref 65–100)
GLUCOSE UR STRIP.AUTO-MCNC: NEGATIVE MG/DL
HCT VFR BLD AUTO: 34.4 % (ref 35–47)
HGB BLD-MCNC: 10.9 G/DL (ref 11.5–16)
HGB UR QL STRIP: ABNORMAL
HYALINE CASTS URNS QL MICRO: ABNORMAL /LPF (ref 0–2)
IMM GRANULOCYTES # BLD AUTO: 0 K/UL (ref 0–0.04)
IMM GRANULOCYTES NFR BLD AUTO: 0 % (ref 0–0.5)
KETONES UR QL STRIP.AUTO: 15 MG/DL
LEUKOCYTE ESTERASE UR QL STRIP.AUTO: NEGATIVE
LIPASE SERPL-CCNC: 29 U/L (ref 13–75)
LYMPHOCYTES # BLD: 2.4 K/UL (ref 0.8–3.5)
LYMPHOCYTES NFR BLD: 38 % (ref 12–49)
MAGNESIUM SERPL-MCNC: NORMAL MG/DL (ref 1.6–2.4)
MCH RBC QN AUTO: 26.5 PG (ref 26–34)
MCHC RBC AUTO-ENTMCNC: 31.7 G/DL (ref 30–36.5)
MCV RBC AUTO: 83.5 FL (ref 80–99)
MONOCYTES # BLD: 0.6 K/UL (ref 0–1)
MONOCYTES NFR BLD: 10 % (ref 5–13)
NEUTS SEG # BLD: 3.2 K/UL (ref 1.8–8)
NEUTS SEG NFR BLD: 51 % (ref 32–75)
NITRITE UR QL STRIP.AUTO: NEGATIVE
NRBC # BLD: 0 K/UL (ref 0–0.01)
NRBC BLD-RTO: 0 PER 100 WBC
PH UR STRIP: 6.5 (ref 5–8)
PLATELET # BLD AUTO: 303 K/UL (ref 150–400)
PMV BLD AUTO: 11.1 FL (ref 8.9–12.9)
POTASSIUM SERPL-SCNC: ABNORMAL MMOL/L (ref 3.5–5.1)
PROT SERPL-MCNC: 8.3 G/DL (ref 6.4–8.2)
PROT UR STRIP-MCNC: ABNORMAL MG/DL
RBC # BLD AUTO: 4.12 M/UL (ref 3.8–5.2)
RBC #/AREA URNS HPF: ABNORMAL /HPF (ref 0–5)
SODIUM SERPL-SCNC: 135 MMOL/L (ref 136–145)
SP GR UR REFRACTOMETRY: 1.02 (ref 1–1.03)
TROPONIN I SERPL HS-MCNC: 5 NG/L (ref 0–51)
UROBILINOGEN UR QL STRIP.AUTO: 1 EU/DL (ref 0.2–1)
WBC # BLD AUTO: 6.2 K/UL (ref 3.6–11)
WBC URNS QL MICRO: ABNORMAL /HPF (ref 0–4)

## 2023-11-16 PROCEDURE — 85379 FIBRIN DEGRADATION QUANT: CPT

## 2023-11-16 PROCEDURE — 36415 COLL VENOUS BLD VENIPUNCTURE: CPT

## 2023-11-16 PROCEDURE — 83735 ASSAY OF MAGNESIUM: CPT

## 2023-11-16 PROCEDURE — 99284 EMERGENCY DEPT VISIT MOD MDM: CPT

## 2023-11-16 PROCEDURE — 85025 COMPLETE CBC W/AUTO DIFF WBC: CPT

## 2023-11-16 PROCEDURE — 6370000000 HC RX 637 (ALT 250 FOR IP): Performed by: EMERGENCY MEDICINE

## 2023-11-16 PROCEDURE — 93005 ELECTROCARDIOGRAM TRACING: CPT | Performed by: EMERGENCY MEDICINE

## 2023-11-16 PROCEDURE — 81001 URINALYSIS AUTO W/SCOPE: CPT

## 2023-11-16 PROCEDURE — 71046 X-RAY EXAM CHEST 2 VIEWS: CPT

## 2023-11-16 PROCEDURE — 83690 ASSAY OF LIPASE: CPT

## 2023-11-16 PROCEDURE — 84484 ASSAY OF TROPONIN QUANT: CPT

## 2023-11-16 PROCEDURE — 80053 COMPREHEN METABOLIC PANEL: CPT

## 2023-11-16 RX ORDER — HYDROCHLOROTHIAZIDE 25 MG/1
25 TABLET ORAL DAILY
Qty: 30 TABLET | Refills: 0 | Status: SHIPPED | OUTPATIENT
Start: 2023-11-16 | End: 2023-12-16

## 2023-11-16 RX ORDER — HYDRALAZINE HYDROCHLORIDE 25 MG/1
50 TABLET, FILM COATED ORAL
Status: COMPLETED | OUTPATIENT
Start: 2023-11-16 | End: 2023-11-16

## 2023-11-16 RX ADMIN — HYDRALAZINE HYDROCHLORIDE 50 MG: 25 TABLET, FILM COATED ORAL at 00:53

## 2023-11-16 ASSESSMENT — PAIN - FUNCTIONAL ASSESSMENT: PAIN_FUNCTIONAL_ASSESSMENT: 0-10

## 2023-11-16 ASSESSMENT — PAIN DESCRIPTION - DESCRIPTORS: DESCRIPTORS: ACHING

## 2023-11-16 ASSESSMENT — PAIN DESCRIPTION - LOCATION: LOCATION: HEAD

## 2023-11-16 ASSESSMENT — ENCOUNTER SYMPTOMS: SHORTNESS OF BREATH: 1

## 2023-11-16 ASSESSMENT — PAIN SCALES - GENERAL: PAINLEVEL_OUTOF10: 5

## 2023-11-16 NOTE — ED PROVIDER NOTES
[facility] EMERGENCY DEPT  EMERGENCY DEPARTMENT ENCOUNTER      Patient Name: Lennox Bryant  MRN: [id number]  [address] [facility] 1995  Date of Evaluation: 11/16/2023  Physician: Nessa Barrios MD    CHIEF COMPLAINT       Chief Complaint   Patient presents with    Headache    Hypertension       HISTORY OF PRESENT ILLNESS   (Location/Symptom, Timing/Onset, Context/Setting, Quality, Duration, Modifying Factors, Severity)   Lennox Bryant, 33 y.o., female     69-year-old female with no significant history presents with chief complaint of hypertension and shortness of breath. Patient reports she was seen several weeks ago at [address] [place] [address] for similar symptoms. She had a normal work-up and was seen by her primary care physician as a follow-up. Her blood pressure normalized and she was not started on blood pressure medication. Nursing Notes were reviewed. REVIEW OF SYSTEMS    (Not required)   Review of Systems   Respiratory:  Positive for shortness of breath. PAST MEDICAL HISTORY     Past Medical History:   Diagnosis Date    Sinusitis     Vitamin D deficiency        SURGICAL HISTORY     No past surgical history on file. CURRENT MEDICATIONS       Discharge Medication List as of 11/16/2023  2:27 AM        CONTINUE these medications which have NOT CHANGED    Details   vitamin B-12 (CYANOCOBALAMIN) 100 MCG tablet Take 0.5 tablets by mouth dailyHistorical Med      vitamin B-6 (PYRIDOXINE) 100 MG tablet Take 1 tablet by mouth dailyHistorical Med             ALLERGIES     Patient has no known allergies.     FAMILY HISTORY       Family History   Problem Relation Age of Onset    Hypertension Mother     Hypertension Father         SOCIAL HISTORY       Social History     Socioeconomic History    Marital status:    Tobacco Use    Smoking status: Never    Smokeless tobacco: Never   Vaping Use    Vaping Use: Never used   Substance and Sexual Activity    Alcohol use: Yes     Comment: 4 a week    Drug use: No    Sexual

## 2023-11-16 NOTE — DISCHARGE INSTRUCTIONS
Thank you for allowing us to provide you with medical care today. We realize that you have many choices for your emergency care needs. We thank you for choosing [facility]. Please choose us in the future for any continued health care needs. The exam and treatment you received in the Emergency Department were for an emergent problem and are not intended as complete care. It is important that you follow up with a doctor, nurse practitioner, or physician's assistant for ongoing care. If your symptoms worsen or you do not improve as expected and you are unable to reach your usual health care provider, you should return to the Emergency Department. We are available 24 hours a day. Please make an appointment with your health care provider(s) for follow up of your Emergency Department visit. Take this sheet with you when you go to your follow-up visit.

## 2023-11-16 NOTE — ED TRIAGE NOTES
Pt ambulatory to triage, reports SOB, dizziness, headache and elevated BP for about a week.   Denies chest pain

## 2023-11-17 LAB
EKG ATRIAL RATE: 84 BPM
EKG DIAGNOSIS: NORMAL
EKG P AXIS: 34 DEGREES
EKG P-R INTERVAL: 138 MS
EKG Q-T INTERVAL: 356 MS
EKG QRS DURATION: 66 MS
EKG QTC CALCULATION (BAZETT): 420 MS
EKG R AXIS: 57 DEGREES
EKG T AXIS: 40 DEGREES
EKG VENTRICULAR RATE: 84 BPM

## 2023-11-20 ENCOUNTER — OFFICE VISIT (OUTPATIENT)
Age: 28
End: 2023-11-20
Payer: MEDICAID

## 2023-11-20 VITALS
OXYGEN SATURATION: 98 % | HEIGHT: 62 IN | BODY MASS INDEX: 25.03 KG/M2 | SYSTOLIC BLOOD PRESSURE: 136 MMHG | HEART RATE: 86 BPM | DIASTOLIC BLOOD PRESSURE: 86 MMHG | WEIGHT: 136 LBS

## 2023-11-20 DIAGNOSIS — R06.09 DOE (DYSPNEA ON EXERTION): ICD-10-CM

## 2023-11-20 DIAGNOSIS — I10 HYPERTENSION, UNSPECIFIED TYPE: ICD-10-CM

## 2023-11-20 DIAGNOSIS — I10 HYPERTENSION, UNSPECIFIED TYPE: Primary | ICD-10-CM

## 2023-11-20 PROCEDURE — 99203 OFFICE O/P NEW LOW 30 MIN: CPT | Performed by: STUDENT IN AN ORGANIZED HEALTH CARE EDUCATION/TRAINING PROGRAM

## 2023-11-20 PROCEDURE — 93975 VASCULAR STUDY: CPT | Performed by: STUDENT IN AN ORGANIZED HEALTH CARE EDUCATION/TRAINING PROGRAM

## 2023-11-20 NOTE — PROGRESS NOTES
Benny Nielsen, DO  [address], [address]  [facility]    Office [phone],G [phone]           Lazara Rodriguez is a 33 y.o. female presents to the office to establish care      Assessment/Recommendations:     Diagnosis Orders   1. Hypertension, unspecified type  2D ECHO COMPLETE ADULT (TTE) W OR WO CONTR- Clinic Performed    DC DUP-SCAN ARTL TUTU ABDL/PEL/SCROT&/RPR ORGN COM    Catecholamines, Plasma Fractionated    Metanephrines Plasma Free    Aldosterone & Renin, Direct with Ratio    TSH      2. LAWSON (dyspnea on exertion)            Cont hctz  Echo  Renal artery duplex  Plasma metanephrine, catecholamines  Tsh  Renin/harry      Primary Care Physician- None, None    Follow-up 6-8 weeks        Subjective:   Presents to establish care. Recently seen in [facility] and [facility] ed for dyspnea. Very hypertensive. Elevated d-dimer,negative cta at [facility]. Recently started on hctz, but did not take for the past few days. Dyspnea improved. Family hx of htn. Past Medical History:   Diagnosis Date    Sinusitis     Vitamin D deficiency         No past surgical history on file.       Current Outpatient Medications:     hydroCHLOROthiazide (HYDRODIURIL) 25 MG tablet, Take 1 tablet by mouth daily, Disp: 30 tablet, Rfl: 0    vitamin B-12 (CYANOCOBALAMIN) 100 MCG tablet, Take 0.5 tablets by mouth daily, Disp: , Rfl:     vitamin B-6 (PYRIDOXINE) 100 MG tablet, Take 1 tablet by mouth daily, Disp: , Rfl:     No Known Allergies     Family History   Problem Relation Age of Onset    Hypertension Mother     Hypertension Father        Social History     Tobacco Use    Smoking status: Never    Smokeless tobacco: Never   Vaping Use    Vaping Use: Never used   Substance Use Topics    Alcohol use: Yes     Comment: 4 a week    Drug use: No       Review of Symptoms:  Pertinent Positive: neg  Pertinent Negative:No chest pain, dyspnea on exertion, shortness of breath, orthopnea, PND    All

## 2023-12-28 ENCOUNTER — TELEPHONE (OUTPATIENT)
Age: 28
End: 2023-12-28

## 2023-12-28 NOTE — TELEPHONE ENCOUNTER
Lvm requesting a call back to reschedule missed echo cardiogram, Renal doppler and follow up with Np about a wk after. Please reschedule to next available. Thanks.

## 2024-06-27 ENCOUNTER — OFFICE VISIT (OUTPATIENT)
Age: 29
End: 2024-06-27
Payer: MEDICAID

## 2024-06-27 VITALS — WEIGHT: 137.8 LBS | SYSTOLIC BLOOD PRESSURE: 136 MMHG | DIASTOLIC BLOOD PRESSURE: 93 MMHG | BODY MASS INDEX: 25.2 KG/M2

## 2024-06-27 DIAGNOSIS — Z87.42 HISTORY OF IRREGULAR MENSTRUAL CYCLES: ICD-10-CM

## 2024-06-27 DIAGNOSIS — Z01.419 ENCOUNTER FOR GYNECOLOGICAL EXAMINATION (GENERAL) (ROUTINE) WITHOUT ABNORMAL FINDINGS: Primary | ICD-10-CM

## 2024-06-27 PROCEDURE — 99395 PREV VISIT EST AGE 18-39: CPT | Performed by: OBSTETRICS & GYNECOLOGY

## 2024-06-27 RX ORDER — DROSPIRENONE 4 MG/1
1 TABLET, FILM COATED ORAL DAILY
Qty: 3 TABLET | Refills: 4 | Status: SHIPPED | OUTPATIENT
Start: 2024-06-27

## 2024-06-27 SDOH — ECONOMIC STABILITY: HOUSING INSECURITY
IN THE LAST 12 MONTHS, WAS THERE A TIME WHEN YOU DID NOT HAVE A STEADY PLACE TO SLEEP OR SLEPT IN A SHELTER (INCLUDING NOW)?: PATIENT DECLINED

## 2024-06-27 SDOH — ECONOMIC STABILITY: INCOME INSECURITY: HOW HARD IS IT FOR YOU TO PAY FOR THE VERY BASICS LIKE FOOD, HOUSING, MEDICAL CARE, AND HEATING?: PATIENT DECLINED

## 2024-06-27 SDOH — ECONOMIC STABILITY: FOOD INSECURITY: WITHIN THE PAST 12 MONTHS, YOU WORRIED THAT YOUR FOOD WOULD RUN OUT BEFORE YOU GOT MONEY TO BUY MORE.: PATIENT DECLINED

## 2024-06-27 SDOH — ECONOMIC STABILITY: FOOD INSECURITY: WITHIN THE PAST 12 MONTHS, THE FOOD YOU BOUGHT JUST DIDN'T LAST AND YOU DIDN'T HAVE MONEY TO GET MORE.: PATIENT DECLINED

## 2024-06-27 ASSESSMENT — PATIENT HEALTH QUESTIONNAIRE - PHQ9
SUM OF ALL RESPONSES TO PHQ9 QUESTIONS 1 & 2: 0
SUM OF ALL RESPONSES TO PHQ QUESTIONS 1-9: 0
1. LITTLE INTEREST OR PLEASURE IN DOING THINGS: NOT AT ALL
SUM OF ALL RESPONSES TO PHQ QUESTIONS 1-9: 0
2. FEELING DOWN, DEPRESSED OR HOPELESS: NOT AT ALL
SUM OF ALL RESPONSES TO PHQ QUESTIONS 1-9: 0
SUM OF ALL RESPONSES TO PHQ QUESTIONS 1-9: 0

## 2024-06-27 NOTE — PROGRESS NOTES
Krista Olmstead is a 28 y.o. female returns for an annual exam     Chief Complaint   Patient presents with    Annual Exam       Patient's last menstrual period was 06/03/2024.  Her periods are light, moderate in flow and usually regular with a 26-32 day interval with 3-7 day duration.  She does not have dysmenorrhea.  Problems: no problems  Birth Control: none.  Last Pap: 6/14/223 negative  She does not have a history of CLAUDIA 2, 3 or cervical cancer.   With regard to the Gardisil vaccine, she has not received it yet      Examination chaperoned by Anusha Leija MA.  
inflammatory lesions present, no masses present  Bladder: non-tender to palpation  Urethra: appears normal  Cervix: normal   Uterus: normal size, shape and consistency  Adnexa: no adnexal tenderness present, no adnexal masses present  Perineum: perineum within normal limits, no evidence of trauma, no rashes or skin lesions present  Anus: anus within normal limits, no hemorrhoids present  Inguinal Lymph Nodes: no lymphadenopathy present    Skin  General Inspection: no rash, no lesions identified    Neurologic/Psychiatric  Mental Status:  Orientation: grossly oriented to person, place and time  Mood and Affect: mood normal, affect appropriate    .  Assessment:  Routine gynecologic examination  Her current medical status is satisfactory with no evidence of significant gynecologic issues.  HTN      Plan:  Counseled re: diet, exercise, healthy lifestyle  Return for yearly wellness visits  Fu with cardiology about HTN  Start Slynd  RTO for evaluation if thinks she is getting  yeast infection

## 2024-06-29 LAB — TSH SERPL DL<=0.005 MIU/L-ACNC: 3.77 UIU/ML (ref 0.45–4.5)

## 2024-07-01 ENCOUNTER — ANCILLARY PROCEDURE (OUTPATIENT)
Age: 29
End: 2024-07-01
Payer: MEDICAID

## 2024-07-01 VITALS
DIASTOLIC BLOOD PRESSURE: 80 MMHG | SYSTOLIC BLOOD PRESSURE: 118 MMHG | HEART RATE: 88 BPM | WEIGHT: 137 LBS | HEIGHT: 62 IN | BODY MASS INDEX: 25.21 KG/M2

## 2024-07-01 DIAGNOSIS — I10 HYPERTENSION, UNSPECIFIED TYPE: ICD-10-CM

## 2024-07-01 DIAGNOSIS — R06.09 DOE (DYSPNEA ON EXERTION): ICD-10-CM

## 2024-07-01 LAB
ECHO AO ASC DIAM: 2.2 CM
ECHO AO ASCENDING AORTA INDEX: 1.35 CM/M2
ECHO AO ROOT DIAM: 2.7 CM
ECHO AO ROOT INDEX: 1.66 CM/M2
ECHO AV AREA PEAK VELOCITY: 2.8 CM2
ECHO AV AREA VTI: 2.8 CM2
ECHO AV AREA/BSA PEAK VELOCITY: 1.7 CM2/M2
ECHO AV AREA/BSA VTI: 1.7 CM2/M2
ECHO AV MEAN GRADIENT: 3 MMHG
ECHO AV MEAN VELOCITY: 0.8 M/S
ECHO AV PEAK GRADIENT: 6 MMHG
ECHO AV PEAK VELOCITY: 1.2 M/S
ECHO AV VELOCITY RATIO: 0.92
ECHO AV VTI: 22.8 CM
ECHO BSA: 1.65 M2
ECHO EST RA PRESSURE: 3 MMHG
ECHO LA DIAMETER INDEX: 1.84 CM/M2
ECHO LA DIAMETER: 3 CM
ECHO LA TO AORTIC ROOT RATIO: 1.11
ECHO LA VOL A-L A2C: 56 ML (ref 22–52)
ECHO LA VOL A-L A4C: 44 ML (ref 22–52)
ECHO LA VOL MOD A2C: 53 ML (ref 22–52)
ECHO LA VOL MOD A4C: 42 ML (ref 22–52)
ECHO LA VOLUME AREA LENGTH: 52 ML
ECHO LA VOLUME INDEX A-L A2C: 34 ML/M2 (ref 16–34)
ECHO LA VOLUME INDEX A-L A4C: 27 ML/M2 (ref 16–34)
ECHO LA VOLUME INDEX AREA LENGTH: 32 ML/M2 (ref 16–34)
ECHO LA VOLUME INDEX MOD A2C: 33 ML/M2 (ref 16–34)
ECHO LA VOLUME INDEX MOD A4C: 26 ML/M2 (ref 16–34)
ECHO LV E' LATERAL VELOCITY: 11 CM/S
ECHO LV E' SEPTAL VELOCITY: 10 CM/S
ECHO LV EDV A2C: 121 ML
ECHO LV EDV A4C: 108 ML
ECHO LV EDV BP: 115 ML (ref 56–104)
ECHO LV EDV INDEX A4C: 66 ML/M2
ECHO LV EDV INDEX BP: 71 ML/M2
ECHO LV EDV NDEX A2C: 74 ML/M2
ECHO LV EJECTION FRACTION A2C: 69 %
ECHO LV EJECTION FRACTION A4C: 61 %
ECHO LV EJECTION FRACTION BIPLANE: 65 % (ref 55–100)
ECHO LV ESV A2C: 37 ML
ECHO LV ESV A4C: 42 ML
ECHO LV ESV BP: 40 ML (ref 19–49)
ECHO LV ESV INDEX A2C: 23 ML/M2
ECHO LV ESV INDEX A4C: 26 ML/M2
ECHO LV ESV INDEX BP: 25 ML/M2
ECHO LV FRACTIONAL SHORTENING: 31 % (ref 28–44)
ECHO LV INTERNAL DIMENSION DIASTOLE INDEX: 2.76 CM/M2
ECHO LV INTERNAL DIMENSION DIASTOLIC: 4.5 CM (ref 3.9–5.3)
ECHO LV INTERNAL DIMENSION SYSTOLIC INDEX: 1.9 CM/M2
ECHO LV INTERNAL DIMENSION SYSTOLIC: 3.1 CM
ECHO LV IVSD: 0.6 CM (ref 0.6–0.9)
ECHO LV MASS 2D: 87.1 G (ref 67–162)
ECHO LV MASS INDEX 2D: 53.4 G/M2 (ref 43–95)
ECHO LV POSTERIOR WALL DIASTOLIC: 0.7 CM (ref 0.6–0.9)
ECHO LV RELATIVE WALL THICKNESS RATIO: 0.31
ECHO LVOT AREA: 3.1 CM2
ECHO LVOT AV VTI INDEX: 0.9
ECHO LVOT DIAM: 2 CM
ECHO LVOT MEAN GRADIENT: 3 MMHG
ECHO LVOT PEAK GRADIENT: 5 MMHG
ECHO LVOT PEAK VELOCITY: 1.1 M/S
ECHO LVOT STROKE VOLUME INDEX: 39.5 ML/M2
ECHO LVOT SV: 64.4 ML
ECHO LVOT VTI: 20.5 CM
ECHO MV A VELOCITY: 0.6 M/S
ECHO MV AREA PHT: 4.8 CM2
ECHO MV AREA VTI: 3.3 CM2
ECHO MV E DECELERATION TIME (DT): 158.3 MS
ECHO MV E VELOCITY: 1.01 M/S
ECHO MV E/A RATIO: 1.68
ECHO MV E/E' LATERAL: 9.18
ECHO MV E/E' RATIO (AVERAGED): 9.64
ECHO MV E/E' SEPTAL: 10.1
ECHO MV LVOT VTI INDEX: 0.94
ECHO MV MAX VELOCITY: 1 M/S
ECHO MV MEAN GRADIENT: 2 MMHG
ECHO MV MEAN VELOCITY: 0.6 M/S
ECHO MV PEAK GRADIENT: 4 MMHG
ECHO MV PRESSURE HALF TIME (PHT): 45.9 MS
ECHO MV VTI: 19.3 CM
ECHO RA AREA 4C: 18 CM2
ECHO RA END SYSTOLIC VOLUME APICAL 4 CHAMBER INDEX BSA: 30 ML/M2
ECHO RA VOLUME: 49 ML
ECHO RV INTERNAL DIMENSION: 3.5 CM
ECHO RV TAPSE: 2.7 CM (ref 1.7–?)

## 2024-07-01 PROCEDURE — 93306 TTE W/DOPPLER COMPLETE: CPT | Performed by: STUDENT IN AN ORGANIZED HEALTH CARE EDUCATION/TRAINING PROGRAM

## 2024-07-02 ENCOUNTER — ANCILLARY PROCEDURE (OUTPATIENT)
Age: 29
End: 2024-07-02
Payer: MEDICAID

## 2024-07-02 VITALS
BODY MASS INDEX: 25.04 KG/M2 | WEIGHT: 136.1 LBS | DIASTOLIC BLOOD PRESSURE: 82 MMHG | SYSTOLIC BLOOD PRESSURE: 126 MMHG | HEIGHT: 62 IN

## 2024-07-02 DIAGNOSIS — I10 HYPERTENSION, UNSPECIFIED TYPE: ICD-10-CM

## 2024-07-02 LAB
DOPAMINE SERPL-MCNC: <30 PG/ML (ref 0–48)
ECHO BSA: 1.64 M2
EPINEPH PLAS-MCNC: 28 PG/ML (ref 0–62)
NOREPINEPH PLAS-MCNC: 444 PG/ML (ref 0–874)
VAS AORTA AT RENAL ARTERIES PSV: 85 CM/S
VAS AORTA DIST AP: 1.26 CM
VAS AORTA DIST TR: 1.33 CM
VAS AORTA MID AP: 1.38 CM
VAS AORTA MID TRANS: 1.35 CM
VAS AORTA PROX AP: 1.55 CM
VAS AORTA PROX TR: 1.67 CM
VAS LEFT HILAR PSV: 61.1 CM/S
VAS LEFT KIDNEY LENGTH: 9.6 CM
VAS LEFT KIDNEY WIDTH: 4.61 CM
VAS LEFT RENAL DIST PSV: 120 CM/S
VAS LEFT RENAL MID PSV: 155.8 CM/S
VAS LEFT RENAL ORIGIN PSV: 127.7 CM/S
VAS LEFT RENAL PROX PSV: 150.3 CM/S
VAS RIGHT HILAR PSV: 40.6 CM/S
VAS RIGHT KIDNEY LENGTH: 9.92 CM
VAS RIGHT KIDNEY WIDTH: 3.95 CM
VAS RIGHT RENAL DIST PSV: 101.6 CM/S
VAS RIGHT RENAL MID PSV: 155.8 CM/S
VAS RIGHT RENAL ORIGIN PSV: 83 CM/S
VAS RIGHT RENAL PROX PSV: 133.1 CM/S

## 2024-07-02 PROCEDURE — 93975 VASCULAR STUDY: CPT | Performed by: SPECIALIST

## 2024-07-03 LAB
METANEPH FREE SERPL-MCNC: <25 PG/ML (ref 0–88)
NORMETANEPHRINE SERPL-MCNC: 52.7 PG/ML (ref 0–210.1)

## 2024-07-09 LAB
ALDOST SERPL-MCNC: 1.4 NG/DL (ref 0–30)
ALDOST/RENIN PLAS-RTO: >8.4 {RATIO} (ref 0–30)
RENIN PLAS-CCNC: <0.167 NG/ML/HR (ref 0.17–5.38)

## 2024-07-11 ENCOUNTER — OFFICE VISIT (OUTPATIENT)
Age: 29
End: 2024-07-11
Payer: MEDICAID

## 2024-07-11 VITALS
DIASTOLIC BLOOD PRESSURE: 78 MMHG | HEART RATE: 96 BPM | SYSTOLIC BLOOD PRESSURE: 118 MMHG | BODY MASS INDEX: 25.73 KG/M2 | HEIGHT: 62 IN | WEIGHT: 139.8 LBS | OXYGEN SATURATION: 99 %

## 2024-07-11 DIAGNOSIS — R06.09 DOE (DYSPNEA ON EXERTION): ICD-10-CM

## 2024-07-11 DIAGNOSIS — I10 HYPERTENSION, UNSPECIFIED TYPE: Primary | ICD-10-CM

## 2024-07-11 DIAGNOSIS — Z82.49 FAMILY HISTORY OF HYPERTENSION: ICD-10-CM

## 2024-07-11 PROCEDURE — 99214 OFFICE O/P EST MOD 30 MIN: CPT

## 2024-07-11 PROCEDURE — 3074F SYST BP LT 130 MM HG: CPT

## 2024-07-11 PROCEDURE — 3078F DIAST BP <80 MM HG: CPT

## 2024-07-11 NOTE — PATIENT INSTRUCTIONS
Dr. Moni Cruz, [facility] [address], [address]  [place], [place] [address] [phone]

## 2024-07-11 NOTE — PROGRESS NOTES
Chief Complaint   Patient presents with    Hypertension     LAWSON     Vitals:    07/11/24 1433   BP: 118/78   Site: Left Upper Arm   Position: Sitting   Pulse: 96   SpO2: 99%   Weight: 63.4 kg (139 lb 12.8 oz)   Height: 1.575 m (5' 2\")       Chest pain NO     ER, urgent care, or hospitalized outside of [facility] since your last visit?  NO     Refills NO   
Negative:No chest pain, dyspnea on exertion, shortness of breath, orthopnea, PND    All Other systems reviewed and are negative for a Comprehensive ROS (10+)    Physical Exam    Vitals:    24 1433   BP: 118/78   Site: Left Upper Arm   Position: Sitting   Pulse: 96   SpO2: 99%   Weight: 63.4 kg (139 lb 12.8 oz)   Height: 1.575 m (5' 2\")         Constitutional:  well-developed and well-nourished. No distress.  HENT: Normocephalic.   Eyes: No scleral icterus.   Neck:  Neck supple. No JVD present.   Pulmonary/Chest: Effort normal and breath sounds normal. No respiratory distress, wheezes or rales.  Cardiovascular: Normal rate, regular rhythm, S1 S2 . Exam reveals no gallop and no friction rub.  No murmur heard.  No edema.  Extremities:  Normal muscle tone  Abdominal:   No abnormal distension.   Neurological:  Moving all extremities, cranial nerves appear grossly intact.  Skin: Skin is not cold.  Not diaphoretic. No erythema.   Psychiatric:  Grossly normal mood and affect.  Intact insight.    Objective Data:    Investigations personally reviewed and interpreted    EC2023- nsr, normal ecg        Investigations reviewed       24    ECHO (TTE) COMPLETE (PRN CONTRAST/BUBBLE/STRAIN/3D) 2024  4:01 PM (Final)    Interpretation Summary    Left Ventricle: Normal left ventricular systolic function. EF by 2D Simpsons Biplane is 65%. Left ventricle size is normal. Normal wall thickness. Normal wall motion. Normal diastolic function.    Image quality is adequate.    Signed by: Bill Pa DO on 2024  4:01 PM              ATTENTION:   This medical record was transcribed using an electronic medical records/speech recognition system.  Although proofread, it may and can contain electronic, spelling and other errors.  Corrections may be executed at a later time.  Please feel free to contact us for any clarifications as needed.

## 2024-10-24 ENCOUNTER — HOSPITAL ENCOUNTER (EMERGENCY)
Facility: HOSPITAL | Age: 29
Discharge: HOME | End: 2024-10-24
Attending: EMERGENCY MEDICINE
Payer: MEDICAID

## 2024-10-24 VITALS
WEIGHT: 138 LBS | OXYGEN SATURATION: 100 % | SYSTOLIC BLOOD PRESSURE: 123 MMHG | HEIGHT: 62 IN | HEART RATE: 94 BPM | RESPIRATION RATE: 16 BRPM | TEMPERATURE: 98.4 F | BODY MASS INDEX: 25.4 KG/M2 | DIASTOLIC BLOOD PRESSURE: 84 MMHG

## 2024-10-24 DIAGNOSIS — R53.83 OTHER FATIGUE: Primary | ICD-10-CM

## 2024-10-24 DIAGNOSIS — R11.0 NAUSEA: ICD-10-CM

## 2024-10-24 LAB
ALBUMIN SERPL-MCNC: 4.2 G/DL (ref 3.5–5.2)
ALBUMIN/GLOB SERPL: 1.2 (ref 1.1–2.2)
ALP SERPL-CCNC: 52 U/L (ref 35–104)
ALT SERPL-CCNC: 10 U/L (ref 10–35)
ANION GAP SERPL CALC-SCNC: 13 MMOL/L (ref 2–12)
APPEARANCE UR: CLEAR
AST SERPL-CCNC: 20 U/L (ref 10–35)
BACTERIA URNS QL MICRO: NEGATIVE /HPF
BASOPHILS # BLD: 0 K/UL (ref 0–1)
BASOPHILS NFR BLD: 1 % (ref 0–1)
BILIRUB SERPL-MCNC: 0.6 MG/DL (ref 0.2–1)
BILIRUB UR QL: NEGATIVE
BUN SERPL-MCNC: 21 MG/DL (ref 6–20)
BUN/CREAT SERPL: 26 (ref 12–20)
CALCIUM SERPL-MCNC: 9.2 MG/DL (ref 8.6–10)
CHLORIDE SERPL-SCNC: 101 MMOL/L (ref 98–107)
CO2 SERPL-SCNC: 23 MMOL/L (ref 22–29)
COLOR UR: ABNORMAL
CREAT SERPL-MCNC: 0.82 MG/DL (ref 0.5–0.9)
DIFFERENTIAL METHOD BLD: ABNORMAL
EOSINOPHIL # BLD: 0 K/UL (ref 0–0.4)
EOSINOPHIL NFR BLD: 1 %
EPITH CASTS URNS QL MICRO: ABNORMAL /LPF
ERYTHROCYTE [DISTWIDTH] IN BLOOD BY AUTOMATED COUNT: 16.3 % (ref 11.5–14.5)
GLOBULIN SER CALC-MCNC: 3.4 G/DL (ref 2–4)
GLUCOSE SERPL-MCNC: 98 MG/DL (ref 65–100)
GLUCOSE UR STRIP.AUTO-MCNC: NEGATIVE MG/DL
HCG UR QL: NEGATIVE
HCT VFR BLD AUTO: 43 % (ref 35–47)
HGB BLD-MCNC: 13.9 G/DL (ref 11.5–16)
HGB UR QL STRIP: NEGATIVE
IMM GRANULOCYTES # BLD AUTO: 0 K/UL (ref 0–0.04)
IMM GRANULOCYTES NFR BLD AUTO: 0 % (ref 0–0.5)
KETONES UR QL STRIP.AUTO: NEGATIVE MG/DL
LEUKOCYTE ESTERASE UR QL STRIP.AUTO: NEGATIVE
LIPASE SERPL-CCNC: 32 U/L (ref 13–60)
LYMPHOCYTES # BLD: 1.8 K/UL (ref 0.8–3.5)
LYMPHOCYTES NFR BLD: 30 % (ref 12–49)
MCH RBC QN AUTO: 26.9 PG (ref 26–34)
MCHC RBC AUTO-ENTMCNC: 32.3 G/DL (ref 30–36.5)
MCV RBC AUTO: 83.3 FL (ref 80–99)
MONOCYTES # BLD: 0.6 K/UL (ref 0–1)
MONOCYTES NFR BLD: 11 % (ref 5–13)
NEUTS SEG # BLD: 3.5 K/UL (ref 1.8–8)
NEUTS SEG NFR BLD: 57 % (ref 32–75)
NITRITE UR QL STRIP.AUTO: NEGATIVE
NRBC # BLD: 0 K/UL (ref 0–0.01)
NRBC BLD-RTO: 0 PER 100 WBC
PH UR STRIP: 8 (ref 5–8)
PLATELET # BLD AUTO: 241 K/UL (ref 150–400)
PMV BLD AUTO: 11.5 FL (ref 8.9–12.9)
POTASSIUM SERPL-SCNC: 4.2 MMOL/L (ref 3.5–5.1)
PROT SERPL-MCNC: 7.6 G/DL (ref 6.4–8.3)
PROT UR STRIP-MCNC: NEGATIVE MG/DL
RBC # BLD AUTO: 5.16 M/UL (ref 3.8–5.2)
RBC #/AREA URNS HPF: ABNORMAL /HPF
SODIUM SERPL-SCNC: 137 MMOL/L (ref 136–145)
SP GR UR REFRACTOMETRY: 1.01 (ref 1–1.03)
URINE CULTURE IF INDICATED: ABNORMAL
UROBILINOGEN UR QL STRIP.AUTO: 0.2 EU/DL (ref 0.2–1)
WBC # BLD AUTO: 6.1 K/UL (ref 3.6–11)
WBC URNS QL MICRO: ABNORMAL /HPF (ref 0–4)

## 2024-10-24 PROCEDURE — 80053 COMPREHEN METABOLIC PANEL: CPT

## 2024-10-24 PROCEDURE — 36415 COLL VENOUS BLD VENIPUNCTURE: CPT

## 2024-10-24 PROCEDURE — 81001 URINALYSIS AUTO W/SCOPE: CPT

## 2024-10-24 PROCEDURE — 83690 ASSAY OF LIPASE: CPT

## 2024-10-24 PROCEDURE — 85025 COMPLETE CBC W/AUTO DIFF WBC: CPT

## 2024-10-24 PROCEDURE — 99283 EMERGENCY DEPT VISIT LOW MDM: CPT

## 2024-10-24 PROCEDURE — 81025 URINE PREGNANCY TEST: CPT

## 2024-10-24 PROCEDURE — 6370000000 HC RX 637 (ALT 250 FOR IP)

## 2024-10-24 RX ORDER — ONDANSETRON 2 MG/ML
4 INJECTION INTRAMUSCULAR; INTRAVENOUS ONCE
Status: DISCONTINUED | OUTPATIENT
Start: 2024-10-24 | End: 2024-10-24

## 2024-10-24 RX ADMIN — ALUMINUM HYDROXIDE, MAGNESIUM HYDROXIDE, AND SIMETHICONE 40 ML: 1200; 120; 1200 SUSPENSION ORAL at 13:45

## 2024-10-24 NOTE — ED PROVIDER NOTES
Palpations: Abdomen is soft.      Tenderness: There is no abdominal tenderness. There is no right CVA tenderness or left CVA tenderness.   Musculoskeletal:         General: Normal range of motion.      Cervical back: Normal range of motion and neck supple.   Skin:     General: Skin is warm.      Capillary Refill: Capillary refill takes less than 2 seconds.   Neurological:      General: No focal deficit present.      Mental Status: She is alert.   Psychiatric:         Mood and Affect: Mood normal.         Behavior: Behavior normal.           EMERGENCY DEPARTMENT COURSE and DIFFERENTIAL DIAGNOSIS/MDM:   Vitals:    Vitals:    10/24/24 1249   BP: 123/84   Pulse: 94   Resp: 16   Temp: 98.4 °F (36.9 °C)   TempSrc: Oral   SpO2: 100%   Weight: 62.6 kg (138 lb)   Height: 1.575 m (5' 2\")       Medical Decision Making  Patient is a 29-year-old female with history of vitamin D deficiency and sinusitis who presents emergency room with reports of fatigue worsening over the last month, but initially over the last year. Ddx: Dehydration, electrolyte abnormality, pregnancy, pancreatitis, and others.  Physical examination shows unremarkable cardiopulmonary examination.  Vitals are stable.  Patient is in no acute distress during my evaluation.  Patient has no abdominal tenderness to palpation.  GI cocktail ordered for pain. CBC unremarkable. CMP unremarkable, but slightly elevated anion gap at 13 which is unremarkable and nonspecific today.  Patient possibly could be dehydrated and reports that she does not drink a lot of water throughout the day. Lipase unremarkable. POC pregnancy negative.  UA unremarkable for any infection or hematuria.  Discussed results with the patient.  Recommended that she follows up with a primary care provider for further evaluation and possible vitamin deficiencies or thyroid problems.  Patient is in no acute distress and stable for discharge. Patient is agreeable to plan. All questions answered. Return

## 2024-10-24 NOTE — DISCHARGE INSTRUCTIONS
Discussed visit today.  Please follow-up with your primary care provider or 1 on the list provided for further evaluation and possible vitamin deficiencies as well as thyroid deficiencies.  Please are drinking more water throughout the day.    Return to the emergency room with any worsening of symptoms.

## 2024-10-24 NOTE — ED TRIAGE NOTES
Pt c/o feeling fatigued over last month, and nasueous when she wakes up. Pt also c/o metallic taste in her mouth.  Pt states negative pregnancy test.  Patient arrives to ED ambulatory w/o difficulty. No acute distress noted in triage. A&O x 4. Skin is warm, dry & intact on obs.